# Patient Record
Sex: MALE | Race: WHITE | Employment: OTHER | URBAN - METROPOLITAN AREA
[De-identification: names, ages, dates, MRNs, and addresses within clinical notes are randomized per-mention and may not be internally consistent; named-entity substitution may affect disease eponyms.]

---

## 2017-01-09 ENCOUNTER — TELEPHONE (OUTPATIENT)
Dept: CARDIOLOGY CLINIC | Age: 67
End: 2017-01-09

## 2017-01-09 NOTE — TELEPHONE ENCOUNTER
----- Message from Barry Costa DO sent at 1/5/2017  8:49 AM EST -----  I am not sure whether I ordered this echocardiogram or not, but his LV function is quite similar to what it was back in December 2014. It is still severely depressed but stable at this juncture. Please let them know and we will discuss it further at his appointment in February.  ES

## 2017-01-09 NOTE — TELEPHONE ENCOUNTER
Pt and pt wife aware of results. Pt is having some epsiodes of sob and dizziness. Pt did go to PCP and was given antibiotic for sinus infection /cough. Pt has not monitored his weight and has not taken lasix. Instructed pt to monitor wt and if wt goes up 3 lbs to call office.  Pt wife verbalized understanding

## 2017-02-16 ENCOUNTER — ANTI-COAG VISIT (OUTPATIENT)
Dept: CARDIOLOGY CLINIC | Age: 67
End: 2017-02-16

## 2017-02-16 DIAGNOSIS — Z79.01 LONG TERM CURRENT USE OF ANTICOAGULANT THERAPY: Primary | ICD-10-CM

## 2017-02-16 DIAGNOSIS — I42.8 CARDIOMYOPATHY, NONISCHEMIC (HCC): ICD-10-CM

## 2017-02-16 LAB
INR BLD: 2.6
PT POC: 30.6 SEC
VALID INTERNAL CONTROL?: YES

## 2017-02-16 NOTE — PROGRESS NOTES
Verbal order and read back per Fermín Olivares NP  The INR is stable and therapeutic.  Continue same dose of coumadin and recheck in 6 weeks  Continue Coumadin 5mg daily except 2.5mg on Tues and Thurs  Patient informed of instructions, read back and verbalized understanding Matt Thrasher LPN

## 2017-03-08 ENCOUNTER — CLINICAL SUPPORT (OUTPATIENT)
Dept: CARDIOLOGY CLINIC | Age: 67
End: 2017-03-08

## 2017-03-08 ENCOUNTER — OFFICE VISIT (OUTPATIENT)
Dept: CARDIOLOGY CLINIC | Age: 67
End: 2017-03-08

## 2017-03-08 VITALS
HEART RATE: 79 BPM | DIASTOLIC BLOOD PRESSURE: 62 MMHG | WEIGHT: 197 LBS | SYSTOLIC BLOOD PRESSURE: 120 MMHG | BODY MASS INDEX: 26.68 KG/M2 | OXYGEN SATURATION: 98 % | HEIGHT: 72 IN

## 2017-03-08 DIAGNOSIS — I42.8 CARDIOMYOPATHY, NONISCHEMIC (HCC): Primary | ICD-10-CM

## 2017-03-08 DIAGNOSIS — I42.8 CARDIOMYOPATHY, NONISCHEMIC (HCC): ICD-10-CM

## 2017-03-08 DIAGNOSIS — Z95.810 AICD (AUTOMATIC CARDIOVERTER/DEFIBRILLATOR) PRESENT: ICD-10-CM

## 2017-03-08 DIAGNOSIS — I50.22 CHRONIC SYSTOLIC CONGESTIVE HEART FAILURE (HCC): Primary | ICD-10-CM

## 2017-03-08 NOTE — MR AVS SNAPSHOT
Visit Information Date & Time Provider Department Dept. Phone Encounter #  
 3/8/2017  3:00 PM Arvilla Lefort, [address] [facility] [address] [id number] Your Appointments 3/30/2017 11:00 AM  
ANTICOAG NURSE with Pt Inr Hv [facility] ([facility]) Appt Note: 6 week INR  
 [address] [facility] [place]arez [id number] [phone] [address] Upcoming Health Maintenance Date Due Hepatitis C Screening 1950 FOBT Q 1 YEAR AGE 50-75 10/21/1998 DTaP/Tdap/Td series (1 - Tdap) 1/28/2003 ZOSTER VACCINE AGE 60> 10/21/2008 GLAUCOMA SCREENING Q2Y 10/21/2013 MEDICARE YEARLY EXAM 10/21/2013 Pneumococcal 65+ Low/Medium Risk (2 of 2 - PPSV23) 10/15/2017 Allergies as of 3/8/2017  Review Complete On: 2/2/2017 By: Shalom Rowland RN Severity Noted Reaction Type Reactions Codeine Low 10/30/2015   Intolerance Nausea and Vomiting Current Immunizations  Never Reviewed Name Date Influenza High Dose Vaccine PF 10/15/2016 Pneumococcal Conjugate (PCV-13) 10/15/2016 Pneumococcal Polysaccharide (PPSV-23) 12/1/2010 Td 1/27/2003 Not reviewed this visit You Were Diagnosed With   
  
 Codes Comments Chronic systolic congestive heart failure (HCC)    -  Primary ICD-10-CM: B18.26 ICD-9-CM: 428.22, 428.0 Cardiomyopathy, nonischemic ([facility] [address]     ICD-10-CM: I42.9 ICD-9-CM: 425.4 AICD (automatic cardioverter/defibrillator) present     ICD-10-CM: Z95.810 ICD-9-CM: V45.02 Vitals BP Pulse Height(growth percentile) Weight(growth percentile) SpO2 BMI  
 120/62 79 6' (1.829 m) 197 lb (89.4 kg) 98% 26.72 kg/m2 Smoking Status Current Every Day Smoker Vitals History BMI and BSA Data Body Mass Index Body Surface Area  
 26.72 kg/m 2 2.13 m 2 Preferred Pharmacy Pharmacy Name Phone [facility] [address] Your Updated Medication List  
  
   
This list is accurate as of: 3/8/17  3:47 PM.  Always use your most recent med list.  
  
  
  
  
 carvedilol 25 mg tablet Commonly known as:  COREG  
TAKE ONE TABLET BY MOUTH TWICE DAILY WITH MEALS  
  
 furosemide 40 mg tablet Commonly known as:  LASIX  
take 1 tablet by mouth once daily AS NEEDED HYDROcodone-acetaminophen 5-325 mg per tablet Commonly known as:  Bella Gopi Take 1 Tab by mouth every four (4) hours as needed for Pain. Max Daily Amount: 6 Tabs. ramipril 10 mg capsule Commonly known as:  ALTACE  
take 1 capsule by mouth once daily  
  
 tadalafil 20 mg tablet Commonly known as:  CIALIS Take 1 Tab by mouth as needed. tamsulosin 0.4 mg capsule Commonly known as:  FLOMAX Take 1 Cap by mouth daily. Discontinue once stone passes. warfarin 5 mg tablet Commonly known as:  COUMADIN  
take 1 tablet by mouth daily EXCEPT ON TUESDAY AND THURSDAY TAKE 2.5MG (HALF A TABLET) We Performed the Following AMB POC EKG ROUTINE W/ 12 LEADS, INTER & REP [20029 CPT(R)] Introducing [facility] SERVICES! No Camarena introduces Cloupia patient portal. Now you can access parts of your medical record, email your doctor's office, and request medication refills online. 1. In your internet browser, go to [email or web address] 2. Click on the First Time User? Click Here link in the Sign In box. You will see the New Member Sign Up page. 3. Enter your Cloupia Access Code exactly as it appears below. You will not need to use this code after youve completed the sign-up process. If you do not sign up before the expiration date, you must request a new code. · Cloupia Access Code: [email or web address] Expires: 6/6/2017  2:38 PM 
 
4.  Enter the last four digits of your Social Security Number (xxxx) and Date of Birth (mm/dd/yyyy) as indicated and click Submit. You will be taken to the next sign-up page. 5. Create a GotaCopy ID. This will be your GotaCopy login ID and cannot be changed, so think of one that is secure and easy to remember. 6. Create a GotaCopy password. You can change your password at any time. 7. Enter your Password Reset Question and Answer. This can be used at a later time if you forget your password. 8. Enter your e-mail address. You will receive e-mail notification when new information is available in [address] 9. Click Sign Up. You can now view and download portions of your medical record. 10. Click the Download Summary menu link to download a portable copy of your medical information. If you have questions, please visit the Frequently Asked Questions section of the GotaCopy website. Remember, GotaCopy is NOT to be used for urgent needs. For medical emergencies, dial 911. Now available from your iPhone and Android! Please provide this summary of care documentation to your next provider. Your primary care clinician is listed as Sonny Oconnell. If you have any questions after today's visit, please call [phone].

## 2017-03-08 NOTE — PROGRESS NOTES
HPI: I saw Yosvany Castro in my office today in cardiovascular evaluation regarding his dilated nonischemic cardiomyopathy. Mr. Jairon Diallo is a very pleasant 77year old white male with history of atypical anginal chest discomfort and an abnormal nuclear myocardial perfusion study back in 2003 showing significant left ventricular dysfunction. His cardiac catheterization at that time demonstrated only mild diffuse coronary artery disease without significant obstructive disease and an ejection fraction that was markedly reduced at 25%. He was placed on Coreg, Altace and Lasix, as well as Coumadin to guard against peripheral embolization from the heart, and he has done very well over the years. He did have an AICD placed in the setting of his worsening left ventricular dysfunction with an upgrade to a biventricular ICD in January of 2006. He just had another generator change a little over a year ago on 5/20/15. He comes to the office today and relates that he is doing very well. He is staying quite active and denies any cardiovascular symptomatology at this time. Encounter Diagnoses   Name Primary?  Chronic systolic congestive heart failure (HCC) Yes    Cardiomyopathy, nonischemic ([address]     AICD (automatic cardioverter/defibrillator) present        Discussion: This gentleman is doing about as well as we could expect and really have no recommendations for change at this time. He is not having any symptoms to suggest the development of symptomatic obstructive coronary artery disease or any signs of heart failure. We are following his INRs in our Coumadin clinic and his INRs have been staying in the 2.0-3.0 range with his latest INR done in late February being 2.6. We did check his AICD today and he had 33 episodes of nonsustained V. tach but there were all for a few seconds the longest of which was 8 seconds in the past 4 months and he had no defibrillations.   He has 6.8 years remaining on his battery. His blood pressure is well-controlled today and since he is otherwise doing well I am simply going to plan to see him again in several months or as needed if any new cardiovascular symptoms surface in the interim. PCP:  Daxa Champion MD       Past Medical History:   Diagnosis Date    Abnormal nuclear cardiac imaging test 01/16/2008    Anterior patchy defect w/minimum ischemia. Inferior, inferior septal, inferoapical scar w/no ischemia. Scar in apex w/minimal ischemia & thinning. EF 27%. Severe global HYK. Gross LVE.  AICD (automatic cardioverter/defibrillator) present     Cardiomyopathy, nonischemic (HCC)     CHF (congestive heart failure) (HCC)     Dizziness     History of depression     History of echocardiogram 12/16/2014    Mod LVE. EF 20-25%. Severe diffuse hypk. Mild conc LVH. Gr 1 DDfx. RVSP 30 mmHg. Mild LAE. Mild MR. Unchanged from study of 9/20/12.     Long term (current) use of anticoagulants 3/1/2011    LV dysfunction     Urolithiasis          Past Surgical History:   Procedure Laterality Date    [facility] AICD CONCERTO BI VENTRIC F064  1/11/06    HX COLONOSCOPY  2005    polyp    HX CYSTECTOMY  1980    on back    HX HEART CATHETERIZATION  2/10/03    HX OTHER SURGICAL      eye surgery at the age of 1    HX OTHER SURGICAL  9/25/2012    lead extraction of Medtronic AICD    HX OTHER SURGICAL  5/20/15    AICD generator change out    HX PACEMAKER      HX TONSILLECTOMY      at 4years of age         Current Outpatient Rx   Name  Route  Sig  Dispense  Refill    warfarin (COUMADIN) 5 mg tablet        take 1 tablet by mouth daily EXCEPT ON TUESDAY AND THURSDAY TAKE 2.5MG (HALF A TABLET)    90 Tab    3      ramipril (ALTACE) 10 mg capsule        take 1 capsule by mouth once daily    90 Cap    3      furosemide (LASIX) 40 mg tablet        take 1 tablet by mouth once daily AS NEEDED    20 Tab    3      tadalafil (CIALIS, ADCIRCA) 20 mg tablet    Oral    Take 1 Tab by mouth as needed. 6 Tab    3      carvedilol (COREG) 25 mg tablet        take 1 tablet by mouth twice a day with meals    180 Tab    3           Allergies   Allergen Reactions    Codeine Nausea and Vomiting       Social   Social History   Substance Use Topics    Smoking status: Current Every Day Smoker     Packs/day: 0.10     Years: 30.00    Smokeless tobacco: Never Used    Alcohol use No         Family history: family history includes Cancer in his father. Review of Systems:   Constitutional: Negative. Respiratory: Negative. Cardiovascular: Negative. Gastrointestinal: Negative. Musculoskeletal: Negative. Physical Exam:   The patient is an alert, oriented, well developed, well nourished 74 y.o.  male who was in no acute distress at the time of my examination. Visit Vitals    /62    Pulse 79    Ht 6' (1.829 m)    Wt 89.4 kg (197 lb)    SpO2 98%    BMI 26.72 kg/m2      BP Readings from Last 3 Encounters:   03/08/17 120/62   02/02/17 107/67   12/06/16 100/70        Wt Readings from Last 3 Encounters:   03/08/17 89.4 kg (197 lb)   02/02/17 88.5 kg (195 lb)   12/06/16 87.5 kg (193 lb)       HEENT: Conjunctivae white, mucosa moist, no pallor or cyanosis. Marked deviation of left eye laterally. Neck: Supple without masses, tenderness or thyromegaly. No jugular venous distention. Carotid upstrokes are full bilaterally, without bruits. Cardiovascular: Chest is symmetrical with good excursion. Defibrillator in left upper chest with well healed scar and one small stitch which I removed. [place] [facility] No lifts, heaves or thrills. S1 and S2 are normal, without appreciable murmurs, rubs, clicks or gallops. Defibrillator site is well healed. Lungs: Clear to auscultation in all fields, except for a few rales in the left base. Abdomen: Soft, with no masses, tenderness or organomegaly. Extremities: No edema, with full peripheral pulses.      Review of Data: Please refer to past medical history for most recent cardiac testing. Results for orders placed or performed in visit on 03/08/17   AMB POC EKG ROUTINE W/ 12 LEADS, INTER & REP     Status: None    Narrative    AV sequentially paced rhythm with a rate of 79. This EKG is different from his EKG of July 22, 2016 since at that time he was in a normal sinus mechanism with atrial sensing and ventricular pacing. Adriel Keita D.O., F.A.C.C. [facility]  [facility]  [address] [place] [address]. [address] [address]    PLEASE NOTE:  This document has been produced using voice recognition software. Unrecognized errors in transcription may be present.

## 2017-03-08 NOTE — PROGRESS NOTES
1. Have you been to the ER, urgent care clinic since your last visit? Hospitalized since your last visit? No     2. Have you seen or consulted any other health care providers outside of the [address] [place] [address] since your last visit? Include any pap smears or colon screening.  no

## 2017-03-10 NOTE — PROGRESS NOTES
I have personally seen and evaluated the device findings. Interrogation reviewed and I agree with assessment.     Yari Beckett

## 2017-03-22 RX ORDER — RAMIPRIL 10 MG/1
CAPSULE ORAL
Qty: 90 CAP | Refills: 3 | Status: SHIPPED | OUTPATIENT
Start: 2017-03-22 | End: 2018-03-26 | Stop reason: SDUPTHER

## 2017-03-30 ENCOUNTER — ANTI-COAG VISIT (OUTPATIENT)
Dept: CARDIOLOGY CLINIC | Age: 67
End: 2017-03-30

## 2017-03-30 DIAGNOSIS — Z79.01 LONG TERM CURRENT USE OF ANTICOAGULANT THERAPY: Primary | ICD-10-CM

## 2017-03-30 DIAGNOSIS — I42.8 CARDIOMYOPATHY, NONISCHEMIC (HCC): ICD-10-CM

## 2017-03-30 LAB
INR BLD: 2
PT POC: 24.4 SEC
VALID INTERNAL CONTROL?: YES

## 2017-03-30 NOTE — PROGRESS NOTES
Verbal order and read back per [facility] NP  The INR is stable and therapeutic.  Continue same dose of coumadin and recheck in 6 weeks  Continue Coumadin 5mg daily except 2.5mg on Tues and Thurs  Patient informed of instructions, read back and verbalized understanding Mt Momin LPN

## 2017-05-10 ENCOUNTER — ANTI-COAG VISIT (OUTPATIENT)
Dept: CARDIOLOGY CLINIC | Age: 67
End: 2017-05-10

## 2017-05-10 DIAGNOSIS — I42.8 CARDIOMYOPATHY, NONISCHEMIC (HCC): ICD-10-CM

## 2017-05-10 DIAGNOSIS — Z79.01 LONG TERM CURRENT USE OF ANTICOAGULANT THERAPY: Primary | ICD-10-CM

## 2017-05-10 LAB
INR BLD: 3.2
PT POC: 38.9 SEC
VALID INTERNAL CONTROL?: YES

## 2017-05-10 NOTE — PROGRESS NOTES
The INR is above the therapeutic range. Ask the patient about bleeding complications. Please make the following adjustments to Coumadin dosing:  Only 2.5mg today then continue Coumadin 5mg daily except 2.5mg on Tues and Thurs  Repeat the INR in 5 weeks

## 2017-05-15 ENCOUNTER — OFFICE VISIT (OUTPATIENT)
Dept: INTERNAL MEDICINE CLINIC | Age: 67
End: 2017-05-15

## 2017-05-15 VITALS
TEMPERATURE: 96.5 F | SYSTOLIC BLOOD PRESSURE: 98 MMHG | OXYGEN SATURATION: 96 % | RESPIRATION RATE: 16 BRPM | WEIGHT: 192 LBS | BODY MASS INDEX: 26.01 KG/M2 | HEIGHT: 72 IN | HEART RATE: 78 BPM | DIASTOLIC BLOOD PRESSURE: 70 MMHG

## 2017-05-15 DIAGNOSIS — M25.572 CHRONIC PAIN OF LEFT ANKLE: Primary | ICD-10-CM

## 2017-05-15 DIAGNOSIS — I42.8 CARDIOMYOPATHY, NONISCHEMIC (HCC): ICD-10-CM

## 2017-05-15 DIAGNOSIS — G89.29 CHRONIC PAIN OF LEFT ANKLE: Primary | ICD-10-CM

## 2017-05-15 NOTE — PATIENT INSTRUCTIONS
Heart Failure: Care Instructions  Your Care Instructions    Heart failure occurs when your heart does not pump as much blood as the body needs. Failure does not mean that the heart has stopped pumping but rather that it is not pumping as well as it should. Over time, this causes fluid buildup in your lungs and other parts of your body. Fluid buildup can cause shortness of breath, fatigue, swollen ankles, and other problems. By taking medicines regularly, reducing sodium (salt) in your diet, checking your weight every day, and making lifestyle changes, you can feel better and live longer. Follow-up care is a key part of your treatment and safety. Be sure to make and go to all appointments, and call your doctor if you are having problems. It's also a good idea to know your test results and keep a list of the medicines you take. How can you care for yourself at home? Medicines  · Be safe with medicines. Take your medicines exactly as prescribed. Call your doctor if you think you are having a problem with your medicine. · Do not take any vitamins, over-the-counter medicine, or herbal products without talking to your doctor first. Toshia Duvalk not take ibuprofen (Advil or Motrin) and naproxen (Aleve) without talking to your doctor first. They could make your heart failure worse. · You may be taking some of the following medicine. ¨ Beta-blockers can slow heart rate, decrease blood pressure, and improve your condition. Taking a beta-blocker may lower your chance of needing to be hospitalized. ¨ Angiotensin-converting enzyme inhibitors (ACEIs) reduce the heart's workload, lower blood pressure, and reduce swelling. Taking an ACEI may lower your chance of needing to be hospitalized again. ¨ Angiotensin II receptor blockers (ARBs) work like ACEIs. Your doctor may prescribe them instead of ACEIs. ¨ Diuretics, also called water pills, reduce swelling.   ¨ Potassium supplements replace this important mineral, which is sometimes lost with diuretics. ¨ Aspirin and other blood thinners prevent blood clots, which can cause a stroke or heart attack. You will get more details on the specific medicines your doctor prescribes. Diet  · Your doctor may suggest that you limit sodium to 2,000 milligrams (mg) a day or less. That is less than 1 teaspoon of salt a day, including all the salt you eat in cooking or in packaged foods. People get most of their sodium from processed foods. Fast food and restaurant meals also tend to be very high in sodium. · Ask your doctor how much liquid you can drink each day. You may have to limit liquids. Weight  · Weigh yourself without clothing at the same time each day. Record your weight. Call your doctor if you gain more than 3 pounds in 2 to 3 days. A sudden weight gain may mean that your heart failure is getting worse. Activity level  · Start light exercise (if your doctor says it is okay). Even if you can only do a small amount, exercise will help you get stronger, have more energy, and manage your weight and your stress. Walking is an easy way to get exercise. Start out by walking a little more than you did before. Bit by bit, increase the amount you walk. · When you exercise, watch for signs that your heart is working too hard. You are pushing yourself too hard if you cannot talk while you are exercising. If you become short of breath or dizzy or have chest pain, stop, sit down, and rest.  · If you feel \"wiped out\" the day after you exercise, walk slower or for a shorter distance until you can work up to a better pace. · Get enough rest at night. Sleeping with 1 or 2 pillows under your upper body and head may help you breathe easier. Lifestyle changes  · Do not smoke. Smoking can make a heart condition worse. If you need help quitting, talk to your doctor about stop-smoking programs and medicines. These can increase your chances of quitting for good.  Quitting smoking may be the most important step you can take to protect your heart. · Limit alcohol to 2 drinks a day for men and 1 drink a day for women. Too much alcohol can cause health problems. · Avoid getting sick from colds and the flu. Get a pneumococcal vaccine shot. If you have had one before, ask your doctor whether you need another dose. Get a flu shot each year. If you must be around people with colds or the flu, wash your hands often. When should you call for help? Call 911 if you have symptoms of sudden heart failure such as:  · You have severe trouble breathing. · You cough up pink, foamy mucus. · You have a new irregular or rapid heartbeat. Call your doctor now or seek immediate medical care if:  · You have new or increased shortness of breath. · You are dizzy or lightheaded, or you feel like you may faint. · You have sudden weight gain, such as 3 pounds or more in 2 to 3 days. · You have increased swelling in your legs, ankles, or feet. · You are suddenly so tired or weak that you cannot do your usual activities. Watch closely for changes in your health, and be sure to contact your doctor if:  · You develop new symptoms. Where can you learn more? Go to http://johanna-carlos.info/. Enter L672 in the search box to learn more about \"Heart Failure: Care Instructions. \"  Current as of: January 27, 2016  Content Version: 11.2  © 6877-6404 [employer]. Care instructions adapted under license by [employer] (which disclaims liability or warranty for this information). If you have questions about a medical condition or this instruction, always ask your healthcare professional. [place] [address] any warranty or liability for your use of this information.

## 2017-05-15 NOTE — PROGRESS NOTES
HPI:   (L) ankle pain x 6 mos (atraumatic); mild pain with swelling intermittently  Hx notable for nonischemic CM (on coumadin)    ROS is otherwise negative. Past Medical History:   Diagnosis Date    Abnormal nuclear cardiac imaging test 01/16/2008    Anterior patchy defect w/minimum ischemia. Inferior, inferior septal, inferoapical scar w/no ischemia. Scar in apex w/minimal ischemia & thinning. EF 27%. Severe global HYK. Gross LVE.  AICD (automatic cardioverter/defibrillator) present     Cardiomyopathy, nonischemic (HCC)     CHF (congestive heart failure) (HCC)     Dizziness     History of depression     History of echocardiogram 12/16/2014    Mod LVE. EF 20-25%. Severe diffuse hypk. Mild conc LVH. Gr 1 DDfx. RVSP 30 mmHg. Mild LAE. Mild MR. Unchanged from study of 9/20/12.     Long term (current) use of anticoagulants 3/1/2011    LV dysfunction     Urolithiasis        Past Surgical History:   Procedure Laterality Date    [facility]    HX COLONOSCOPY  2005    polyp    HX CYSTECTOMY  1980    on back    HX HEART CATHETERIZATION  2/10/03    HX OTHER SURGICAL      eye surgery at the age of 1    HX OTHER SURGICAL  9/25/2012    lead extraction of [employer]tronic AICD    HX OTHER SURGICAL  5/20/15    AICD generator change out    HX PACEMAKER      HX TONSILLECTOMY      at 4years of age       Social History     Social History    Marital status:      Spouse name: N/A    Number of children: N/A    Years of education: N/A     Occupational History    QA      Social History Main Topics    Smoking status: Current Every Day Smoker     Packs/day: 0.10     Years: 30.00    Smokeless tobacco: Never Used    Alcohol use No    Drug use: No    Sexual activity: Not on file     Other Topics Concern    Not on file     Social History Narrative       Allergies   Allergen Reactions    Codeine Nausea and Vomiting       Family History   Problem Relation Age of Onset  Cancer Father        Current Outpatient Prescriptions   Medication Sig Dispense Refill    ramipril (ALTACE) 10 mg capsule TAKE ONE CAPSULE BY MOUTH ONCE DAILY 90 Cap 3    tamsulosin (FLOMAX) 0.4 mg capsule Take 1 Cap by mouth daily. Discontinue once stone passes. 7 Cap 0    carvedilol (COREG) 25 mg tablet TAKE ONE TABLET BY MOUTH TWICE DAILY WITH MEALS 180 Tab 2    warfarin (COUMADIN) 5 mg tablet take 1 tablet by mouth daily EXCEPT ON TUESDAY AND THURSDAY TAKE 2.5MG (HALF A TABLET) 90 Tab 3    furosemide (LASIX) 40 mg tablet take 1 tablet by mouth once daily AS NEEDED 20 Tab 3    tadalafil (CIALIS, ADCIRCA) 20 mg tablet Take 1 Tab by mouth as needed. 6 Tab 3           Visit Vitals    BP 98/70 (BP 1 Location: Left arm, BP Patient Position: Sitting)    Pulse 78    Temp 96.5 °F (35.8 °C) (Tympanic)    Resp 16    Ht 6' (1.829 m)    Wt 192 lb (87.1 kg)    SpO2 96%    BMI 26.04 kg/m2       PE  Well nourished in NAD  HEENT:   OP: clear. Neck: supple w/o mass or bruits. Chest: clear. CV: RRR w/o m,r,g; pulses intact. Abd: soft, NT, w/o HSM or mass. Ext: tr edema. (L) ankle: minimal swelling/tendernss; FROM with mild pain  Neuro: NF. Assessment and Plan    Encounter Diagnoses   Name Primary?  Chronic pain of left ankle Yes    Cardiomyopathy, nonischemic (HCC)      OA of (L) ankle - tylenol prn; to ortho if worsens  Nonischemic CM - management per cardiology  The patient was counseled on the dangers of tobacco use, and was advised to quit. Reviewed strategies to maximize success, including removing cigarettes and smoking materials from environment.   OV 6 mos or prn  I have explained plan to patient and the patient verbalizes understanding

## 2017-05-15 NOTE — PROGRESS NOTES
1. Have you been to the ER, urgent care clinic since your last visit? Hospitalized since your last visit? Yes When: march Where: [facility] Reason for visit: kidney stones    2. Have you seen or consulted any other health care providers outside of the [address] [place] [address] since your last visit? Include any pap smears or colon screening.  No

## 2017-05-23 RX ORDER — WARFARIN SODIUM 5 MG/1
TABLET ORAL
Qty: 90 TAB | Refills: 3 | Status: SHIPPED | OUTPATIENT
Start: 2017-05-23 | End: 2018-07-30 | Stop reason: SDUPTHER

## 2017-05-25 ENCOUNTER — ANTI-COAG VISIT (OUTPATIENT)
Dept: CARDIOLOGY CLINIC | Age: 67
End: 2017-05-25

## 2017-05-25 DIAGNOSIS — I42.8 CARDIOMYOPATHY, NONISCHEMIC (HCC): ICD-10-CM

## 2017-05-25 DIAGNOSIS — Z79.01 LONG TERM CURRENT USE OF ANTICOAGULANT THERAPY: Primary | ICD-10-CM

## 2017-05-25 LAB
INR BLD: 2.3
PT POC: 27.6 SECONDS
VALID INTERNAL CONTROL?: YES

## 2017-05-25 NOTE — PROGRESS NOTES
The INR is stable and therapeutic. Hold today then continue Coumadin 5mg daily except 2.5mg on Tues and Thurs (after dental surgery) and recheck in 3 weeks.         Verbal order and read back per Lala Craven

## 2017-06-15 ENCOUNTER — ANTI-COAG VISIT (OUTPATIENT)
Dept: CARDIOLOGY CLINIC | Age: 67
End: 2017-06-15

## 2017-06-15 DIAGNOSIS — I42.8 CARDIOMYOPATHY, NONISCHEMIC (HCC): Primary | ICD-10-CM

## 2017-06-15 DIAGNOSIS — Z79.01 LONG TERM CURRENT USE OF ANTICOAGULANT THERAPY: ICD-10-CM

## 2017-06-15 LAB
INR BLD: 3.2
PT POC: 38.1 SECONDS
VALID INTERNAL CONTROL?: YES

## 2017-06-15 NOTE — PROGRESS NOTES
Verbal order and read back per Amada Judge NP  The INR is above the therapeutic range. Ask the patient about bleeding complications. Please make the following adjustments to Coumadin dosing: Hold X 1 then continue Coumadin 5mg daily except 2.5mg on Tues and Thurs   Repeat the INR in 3 weeks. Patient informed of instructions, read back and verbalized understanding Hiral Magaña LPN    Lab slip provided for patient to have PT/INR drawn at lab closer to home.

## 2017-07-07 ENCOUNTER — TELEPHONE ANTICOAG (OUTPATIENT)
Dept: CARDIOLOGY CLINIC | Age: 67
End: 2017-07-07

## 2017-07-07 DIAGNOSIS — I42.8 CARDIOMYOPATHY, NONISCHEMIC (HCC): ICD-10-CM

## 2017-07-07 DIAGNOSIS — Z79.01 LONG TERM CURRENT USE OF ANTICOAGULANT THERAPY: Primary | ICD-10-CM

## 2017-07-07 LAB
INR PPP: 2.5 (ref 0.8–1.2)
PROTHROMBIN TIME: 25.2 SEC (ref 9.1–12)

## 2017-07-07 NOTE — PROGRESS NOTES
Verbal order and read back per Anuradha Park, NP  The INR is stable and therapeutic. Continue same dose of coumadin and recheck in 5 weeks  Continue Coumadin 5mg daily except 2.5mg on Tues and Thurs   Patient's wife informed of instructions, read back and verbalized understanding. Appointment scheduled in office in 5 weeks.   Shireen Persaud LPN

## 2017-08-09 ENCOUNTER — ANTI-COAG VISIT (OUTPATIENT)
Dept: CARDIOLOGY CLINIC | Age: 67
End: 2017-08-09

## 2017-08-09 DIAGNOSIS — Z79.01 LONG TERM CURRENT USE OF ANTICOAGULANT THERAPY: Primary | ICD-10-CM

## 2017-08-09 DIAGNOSIS — I42.8 CARDIOMYOPATHY, NONISCHEMIC (HCC): ICD-10-CM

## 2017-08-09 LAB
INR BLD: 3.3
PT POC: 39.1 SECONDS
VALID INTERNAL CONTROL?: YES

## 2017-08-09 NOTE — PROGRESS NOTES
Verbal order and read back per Amando Schooling NP  The INR is above the therapeutic range. Ask the patient about bleeding complications. Please make the following adjustments to Coumadin dosing: Take 2.5 mg X 1 then Continue Coumadin 5mg daily except 2.5mg on Tues and Thurs   Repeat the INR in 5 weeks. (patient request 6 weeks appointment, informed patient that since INR was slightly elevated he should return sooner.  He agreed upon 5 weeks)  Patient informed of instructions, read back and verbalized understanding Deepali Medeiros LPN

## 2017-09-08 RX ORDER — CARVEDILOL 25 MG/1
TABLET ORAL
Qty: 180 TAB | Refills: 2 | Status: SHIPPED | OUTPATIENT
Start: 2017-09-08 | End: 2018-06-19 | Stop reason: SDUPTHER

## 2017-09-13 ENCOUNTER — ANTI-COAG VISIT (OUTPATIENT)
Dept: CARDIOLOGY CLINIC | Age: 67
End: 2017-09-13

## 2017-09-13 DIAGNOSIS — Z79.01 LONG TERM CURRENT USE OF ANTICOAGULANT THERAPY: Primary | ICD-10-CM

## 2017-09-13 DIAGNOSIS — I42.8 CARDIOMYOPATHY, NONISCHEMIC (HCC): ICD-10-CM

## 2017-09-13 LAB
INR BLD: 2.4
PT POC: 29.3 SECONDS
VALID INTERNAL CONTROL?: YES

## 2017-09-13 NOTE — PROGRESS NOTES
The INR is stable and therapeutic.    Continue Coumadin 5mg daily except 2.5mg on Tues and Thurs   Recheck INR in 6 weeks

## 2017-10-25 ENCOUNTER — ANTI-COAG VISIT (OUTPATIENT)
Dept: CARDIOLOGY CLINIC | Age: 67
End: 2017-10-25

## 2017-10-25 DIAGNOSIS — I42.8 CARDIOMYOPATHY, NONISCHEMIC (HCC): Primary | ICD-10-CM

## 2017-10-25 DIAGNOSIS — Z79.01 LONG TERM CURRENT USE OF ANTICOAGULANT THERAPY: ICD-10-CM

## 2017-10-25 LAB
INR BLD: 3
PT POC: 35.5 SECONDS
VALID INTERNAL CONTROL?: YES

## 2017-10-25 NOTE — PROGRESS NOTES
Verbal order and read back per Maddy Zelaya NP  The INR is stable and therapeutic. Continue same dose of coumadin and recheck in 5 weeks  Continue Coumadin 5mg daily except 2.5mg on Tues and Thurs. Patient states he will have a serving of green leafy vegetables today.    Patient informed of instructions, read back and verbalized understanding Svitlana Smart LPN

## 2017-11-28 ENCOUNTER — TELEPHONE (OUTPATIENT)
Dept: CARDIOLOGY CLINIC | Age: 67
End: 2017-11-28

## 2017-11-28 NOTE — TELEPHONE ENCOUNTER
Patient's wife called to informed the office the patient was diagnosed with pneumonia and started on Azithromycin on 11/26/17. Patient is on Coumadin. Verbal order and read back per [address], NP to decrease Coumadin to 2.5 mg daily while taking antibiotic. Wife will contact [facility] to see if patient can have PT/INR drawn during follow up visit at there facility this week or she will attempt to bring patient to INR appointment in our office.  Sharri Lenz LPN

## 2017-11-29 ENCOUNTER — ANTI-COAG VISIT (OUTPATIENT)
Dept: CARDIOLOGY CLINIC | Age: 67
End: 2017-11-29

## 2017-11-29 ENCOUNTER — OFFICE VISIT (OUTPATIENT)
Dept: INTERNAL MEDICINE CLINIC | Age: 67
End: 2017-11-29

## 2017-11-29 VITALS
RESPIRATION RATE: 16 BRPM | BODY MASS INDEX: 25.06 KG/M2 | HEIGHT: 72 IN | SYSTOLIC BLOOD PRESSURE: 100 MMHG | OXYGEN SATURATION: 97 % | TEMPERATURE: 96.3 F | HEART RATE: 74 BPM | WEIGHT: 185 LBS | DIASTOLIC BLOOD PRESSURE: 70 MMHG

## 2017-11-29 DIAGNOSIS — Z79.01 LONG TERM CURRENT USE OF ANTICOAGULANT THERAPY: ICD-10-CM

## 2017-11-29 DIAGNOSIS — I42.8 CARDIOMYOPATHY, NONISCHEMIC (HCC): Primary | ICD-10-CM

## 2017-11-29 DIAGNOSIS — J06.9 ACUTE URI: ICD-10-CM

## 2017-11-29 DIAGNOSIS — I42.8 CARDIOMYOPATHY, NONISCHEMIC (HCC): ICD-10-CM

## 2017-11-29 DIAGNOSIS — Z00.00 ROUTINE GENERAL MEDICAL EXAMINATION AT A HEALTH CARE FACILITY: Primary | ICD-10-CM

## 2017-11-29 DIAGNOSIS — I50.22 CHRONIC SYSTOLIC CONGESTIVE HEART FAILURE (HCC): ICD-10-CM

## 2017-11-29 LAB
INR BLD: 4.3
PT POC: 51.2 SECONDS
VALID INTERNAL CONTROL?: YES

## 2017-11-29 RX ORDER — ALBUTEROL SULFATE 90 UG/1
AEROSOL, METERED RESPIRATORY (INHALATION)
Qty: 1 INHALER | Refills: 1 | Status: SHIPPED | OUTPATIENT
Start: 2017-11-29 | End: 2021-08-23

## 2017-11-29 NOTE — PATIENT INSTRUCTIONS
Upper Respiratory Infection (Cold): Care Instructions  Your Care Instructions    An upper respiratory infection, or URI, is an infection of the nose, sinuses, or throat. URIs are spread by coughs, sneezes, and direct contact. The common cold is the most frequent kind of URI. The flu and sinus infections are other kinds of URIs. Almost all URIs are caused by viruses. Antibiotics won't cure them. But you can treat most infections with home care. This may include drinking lots of fluids and taking over-the-counter pain medicine. You will probably feel better in 4 to 10 days. The doctor has checked you carefully, but problems can develop later. If you notice any problems or new symptoms, get medical treatment right away. Follow-up care is a key part of your treatment and safety. Be sure to make and go to all appointments, and call your doctor if you are having problems. It's also a good idea to know your test results and keep a list of the medicines you take. How can you care for yourself at home? · To prevent dehydration, drink plenty of fluids, enough so that your urine is light yellow or clear like water. Choose water and other caffeine-free clear liquids until you feel better. If you have kidney, heart, or liver disease and have to limit fluids, talk with your doctor before you increase the amount of fluids you drink. · Take an over-the-counter pain medicine, such as acetaminophen (Tylenol), ibuprofen (Advil, Motrin), or naproxen (Aleve). Read and follow all instructions on the label. · Before you use cough and cold medicines, check the label. These medicines may not be safe for young children or for people with certain health problems. · Be careful when taking over-the-counter cold or flu medicines and Tylenol at the same time. Many of these medicines have acetaminophen, which is Tylenol. Read the labels to make sure that you are not taking more than the recommended dose.  Too much acetaminophen (Tylenol) can be harmful. · Get plenty of rest.  · Do not smoke or allow others to smoke around you. If you need help quitting, talk to your doctor about stop-smoking programs and medicines. These can increase your chances of quitting for good. When should you call for help? Call 911 anytime you think you may need emergency care. For example, call if:  ? · You have severe trouble breathing. ?Call your doctor now or seek immediate medical care if:  ? · You seem to be getting much sicker. ? · You have new or worse trouble breathing. ? · You have a new or higher fever. ? · You have a new rash. ? Watch closely for changes in your health, and be sure to contact your doctor if:  ? · You have a new symptom, such as a sore throat, an earache, or sinus pain. ? · You cough more deeply or more often, especially if you notice more mucus or a change in the color of your mucus. ? · You do not get better as expected. Where can you learn more? Go to http://johanna-carlos.info/. Enter K809 in the search box to learn more about \"Upper Respiratory Infection (Cold): Care Instructions. \"  Current as of: May 12, 2017  Content Version: 11.4  © 3966-8583 [employer]. Care instructions adapted under license by [employer] (which disclaims liability or warranty for this information). If you have questions about a medical condition or this instruction, always ask your healthcare professional. [place] [address] any warranty or liability for your use of this information. Medicare Wellness Visit, Male    The best way to live healthy is to have a healthy lifestyle by eating a well-balanced diet, exercising regularly, limiting alcohol and stopping smoking. Regular physical exams and screening tests are another way to keep healthy. Preventive exams provided by your health care provider can find health problems before they become diseases or illnesses.  Preventive services including immunizations, screening tests, monitoring and exams can help you take care of your own health. All people over age 72 should have a pneumovax  and and a prevnar shot to prevent pneumonia. These are once in a lifetime unless you and your provider decide differently. All people over 65 should have a yearly flu shot and a tetanus vaccine every 10 years. Screening for diabetes mellitus with a blood sugar test should be done every year. Glaucoma is a disease of the eye due to increased ocular pressure that can lead to blindness and it should be done every year by an eye professional.    Cardiovascular screening tests that check for elevated lipids (fatty part of blood) which can lead to heart disease and strokes should be done every 5 years. Colorectal screening that evaluates for blood or polyps in your colon should be done yearly as a stool test or every five years as a flexible sigmoidoscope or every 10 years as a colonoscopy up to age 76. Men up to age 76 may need a screening blood test for prostate cancer at certain intervals, depending on their personal and family history. This decision is between the patient and his provider. If you have been a smoker or had family history of abdominal aortic aneurysms, you and your provider may decide to schedule an ultrasound test of your aorta. Hepatitis C screening is also recommended for anyone born between [birth year] through [address]. A shingles vaccine is also recommended once in a lifetime after age 61. Your Medicare Wellness Exam is recommended annually.     Here is a list of your current Health Maintenance items with a due date:  Health Maintenance Due   Topic Date Due    Hepatitis C Test  1950    Stool testing for trace blood  10/21/1998    DTaP/Tdap/Td  (1 - Tdap) 01/28/2003    Shingles Vaccine  08/21/2008    Glaucoma Screening   10/21/2013    Annual Well Visit  10/21/2013    Flu Vaccine  08/01/2017    Pneumococcal Vaccine (2 of 2 - PPSV23) 10/15/2017

## 2017-11-29 NOTE — PROGRESS NOTES
1. Have you been to the ER, urgent care clinic since your last visit? Hospitalized since your last visit? Yes When: nov 26 Where: patient [facility] Reason for visit: flu symptom    2. Have you seen or consulted any other health care providers outside of the [address] [place] [address] since your last visit? Include any pap smears or colon screening.  No

## 2017-11-29 NOTE — PROGRESS NOTES
Verbal order and read back per Rocío Mason NP  The INR is above the therapeutic range. Ask the patient about bleeding complications. Please make the following adjustments to Coumadin dosing: Hold today then 2.5 mg X 2 while on antibiotic then Continue Coumadin 5mg daily except 2.5mg on Tues and Thurs   Repeat the INR in 1 week.   Patient and wife informed of instructions, read back and verbalized understanding Marily Palumbo LPN

## 2017-11-29 NOTE — PROGRESS NOTES
This is an Initial Medicare Annual Wellness Exam (AWV) (Performed 12 months after IPPE or effective date of Medicare Part B enrollment, Once in a lifetime)    I have reviewed the patient's medical history in detail and updated the computerized patient record. History     Past Medical History:   Diagnosis Date    Abnormal nuclear cardiac imaging test 01/16/2008    Anterior patchy defect w/minimum ischemia. Inferior, inferior septal, inferoapical scar w/no ischemia. Scar in apex w/minimal ischemia & thinning. EF 27%. Severe global HYK. Gross LVE.  AICD (automatic cardioverter/defibrillator) present     Cardiomyopathy, nonischemic (HCC)     CHF (congestive heart failure) (HCC)     Dizziness     History of depression     History of echocardiogram 12/16/2014    Mod LVE. EF 20-25%. Severe diffuse hypk. Mild conc LVH. Gr 1 DDfx. RVSP 30 mmHg. Mild LAE. Mild MR. Unchanged from study of 9/20/12.     Long term (current) use of anticoagulants 3/1/2011    LV dysfunction     Urolithiasis       Past Surgical History:   Procedure Laterality Date    [facility]. AICD CONCERTO BI VENTRIC G488  1/11/06    HX COLONOSCOPY  2005    polyp    HX CYSTECTOMY  1980    on back    HX HEART CATHETERIZATION  2/10/03    HX OTHER SURGICAL      eye surgery at the age of 1    HX OTHER SURGICAL  9/25/2012    lead extraction of [employer] AICD    HX OTHER SURGICAL  5/20/15    AICD generator change out    HX PACEMAKER      HX TONSILLECTOMY      at 4years of age     Current Outpatient Prescriptions   Medication Sig Dispense Refill    albuterol (PROVENTIL HFA, VENTOLIN HFA, PROAIR HFA) 90 mcg/actuation inhaler 2 puffs q 6 hrs prn cough/congestion 1 Inhaler 1    carvedilol (COREG) 25 mg tablet TAKE ONE TABLET BY MOUTH TWICE DAILY WITH MEALS 180 Tab 2    warfarin (COUMADIN) 5 mg tablet TAKE ONE TABLET BY MOUTH ONCE DAILY EXCEPT ON TUESDAY AND THURSDAY TAKE ONE-HALF TABLET 90 Tab 3    ramipril (ALTACE) 10 mg capsule TAKE ONE CAPSULE BY MOUTH ONCE DAILY 90 Cap 3    tamsulosin (FLOMAX) 0.4 mg capsule Take 1 Cap by mouth daily. Discontinue once stone passes. 7 Cap 0    furosemide (LASIX) 40 mg tablet take 1 tablet by mouth once daily AS NEEDED 20 Tab 3    tadalafil (CIALIS, ADCIRCA) 20 mg tablet Take 1 Tab by mouth as needed. 6 Tab 3     Allergies   Allergen Reactions    Codeine Nausea and Vomiting     Family History   Problem Relation Age of Onset    Cancer Father      Social History   Substance Use Topics    Smoking status: Current Every Day Smoker     Packs/day: 0.10     Years: 30.00    Smokeless tobacco: Never Used    Alcohol use No     Patient Active Problem List   Diagnosis Code    Cardiomyopathy, nonischemic ([facility] [address] I42.8    AICD (automatic cardioverter/defibrillator) present Z95.810    Long term current use of anticoagulant therapy Z79.01    AICD lead malfunction T82.110A    CHF (congestive heart failure) ([facility] [address] I50.9    Advance directive discussed with patient Z71.89       Depression Risk Factor Screening:     PHQ over the last two weeks 5/15/2017   Little interest or pleasure in doing things Not at all   Feeling down, depressed or hopeless Not at all   Total Score PHQ 2 0     Alcohol Risk Factor Screening: You do not drink alcohol or very rarely. Functional Ability and Level of Safety:     Hearing Loss  Hearing is good. Activities of Daily Living  The home contains: no safety equipment. Patient does total self care    Fall Risk  Fall Risk Assessment, last 12 mths 10/30/2015   Able to walk? Yes   Fall in past 12 months? Yes   Fall with injury?  Yes   Number of falls in past 12 months 1   Fall Risk Score 2       Abuse Screen  Patient is not abused    Cognitive Screening   Evaluation of Cognitive Function:  Has your family/caregiver stated any concerns about your memory: no  Normal    Patient Care Team   Patient Care Team:  Hernan Mcmahon MD as PCP - General (Internal Medicine)  Gregorio Perez, DO (Cardiology)    Assessment/Plan   Education and counseling provided:  Are appropriate based on today's review and evaluation    Diagnoses and all orders for this visit:    1. Routine general medical examination at a health care facility    2. Cardiomyopathy, nonischemic (HCC)  -     METABOLIC PANEL, COMPREHENSIVE; Future  -     CBC WITH AUTOMATED DIFF; Future    3. Chronic systolic congestive heart failure (HCC)  -     LIPID PANEL; Future    4. Acute URI    Other orders  -     albuterol (PROVENTIL HFA, VENTOLIN HFA, PROAIR HFA) 90 mcg/actuation inhaler; 2 puffs q 6 hrs prn cough/congestion         Health Maintenance Due   Topic Date Due    Hepatitis C Screening  1950    FOBT Q 1 YEAR AGE 50-75  10/21/1998    DTaP/Tdap/Td series (1 - Tdap) 01/28/2003    ZOSTER VACCINE AGE 60>  08/21/2008    GLAUCOMA SCREENING Q2Y  10/21/2013    MEDICARE YEARLY EXAM  10/21/2013   Medicare wellness performed  Labs soon to assess metabolic parameters  Continue dietary/exercise efforts  [place] advised  Vaccines: flu vaccine already given  Advance directive discussed    PROGRESS NOTE  HPI:   Routine f/u  Hx notable for nonischemic CM  Recently rx'd at [facility] for URI (given a zpak with improving sinusitis/cough); CXR done ?pneumonia by pt report  w/o chest pain/abd. discomfort; no increased dyspnea; no pedal edema; denies constitutional complaints of fever, night sweats or wt loss; no evidence of GI/ hemorrhage; chronic BPH sxs. Activity is age appropriate. ROS is otherwise negative. Past Medical History:   Diagnosis Date    Abnormal nuclear cardiac imaging test 01/16/2008    Anterior patchy defect w/minimum ischemia. Inferior, inferior septal, inferoapical scar w/no ischemia. Scar in apex w/minimal ischemia & thinning. EF 27%. Severe global HYK. Gross LVE.     AICD (automatic cardioverter/defibrillator) present     Cardiomyopathy, nonischemic (HCC)     CHF (congestive heart failure) (HCC)     Dizziness     History of depression     History of echocardiogram 12/16/2014    Mod LVE. EF 20-25%. Severe diffuse hypk. Mild conc LVH. Gr 1 DDfx. RVSP 30 mmHg. Mild LAE. Mild MR. Unchanged from study of 9/20/12.  Long term (current) use of anticoagulants 3/1/2011    LV dysfunction     Urolithiasis        Past Surgical History:   Procedure Laterality Date    [facility] AICD CONCERTO BI VENTRIC C684  1/11/06    HX COLONOSCOPY  2005    polyp    HX CYSTECTOMY  1980    on back    HX HEART CATHETERIZATION  2/10/03    HX OTHER SURGICAL      eye surgery at the age of 1    HX OTHER SURGICAL  9/25/2012    lead extraction of [employer]tr[employer] AICD    HX OTHER SURGICAL  5/20/15    AICD generator change out    HX PACEMAKER      HX TONSILLECTOMY      at 4years of age       Social History     Social History    Marital status:      Spouse name: N/A    Number of children: N/A    Years of education: N/A     Occupational History    QA      Social History Main Topics    Smoking status: Current Every Day Smoker     Packs/day: 0.10     Years: 30.00    Smokeless tobacco: Never Used    Alcohol use No    Drug use: No    Sexual activity: Not on file     Other Topics Concern    Not on file     Social History Narrative       Allergies   Allergen Reactions    Codeine Nausea and Vomiting       Family History   Problem Relation Age of Onset    Cancer Father        Current Outpatient Prescriptions   Medication Sig Dispense Refill    albuterol (PROVENTIL HFA, VENTOLIN HFA, PROAIR HFA) 90 mcg/actuation inhaler 2 puffs q 6 hrs prn cough/congestion 1 Inhaler 1    carvedilol (COREG) 25 mg tablet TAKE ONE TABLET BY MOUTH TWICE DAILY WITH MEALS 180 Tab 2    warfarin (COUMADIN) 5 mg tablet TAKE ONE TABLET BY MOUTH ONCE DAILY EXCEPT ON TUESDAY AND THURSDAY TAKE ONE-HALF TABLET 90 Tab 3    ramipril (ALTACE) 10 mg capsule TAKE ONE CAPSULE BY MOUTH ONCE DAILY 90 Cap 3    tamsulosin (FLOMAX) 0.4 mg capsule Take 1 Cap by mouth daily.  Discontinue once stone passes. 7 Cap 0    furosemide (LASIX) 40 mg tablet take 1 tablet by mouth once daily AS NEEDED 20 Tab 3    tadalafil (CIALIS, ADCIRCA) 20 mg tablet Take 1 Tab by mouth as needed. 6 Tab 3           Visit Vitals    /70 (BP 1 Location: Left arm, BP Patient Position: Sitting)    Pulse 74    Temp 96.3 °F (35.7 °C) (Tympanic)    Resp 16    Ht 6' (1.829 m)    Wt 185 lb (83.9 kg)    SpO2 97%    BMI 25.09 kg/m2       PE  Well nourished in NAD  HEENT:   OP: clear. Neck: supple w/o mass or bruits. Chest: clear. CV: RRR w/o m,r,g; pulses intact. Abd: soft, NT, w/o HSM or mass. Rectal: heme neg; prostate 3 FBS and smooth  Ext: w/o edema. Neuro: NF. Assessment and Plan    Encounter Diagnoses   Name Primary?  Routine general medical examination at a health care facility Yes    Cardiomyopathy, nonischemic ([facility] [address]     Chronic systolic congestive heart failure (HCC)     Acute URI    nonischemic CM - management per cardiology  Recent URI/bronchitis - improving with abx rx; albuterol prn  F/U worsening or unimproved 7 days  The patient was counseled on the dangers of tobacco use, and was advised to quit. Reviewed strategies to maximize success, including removing cigarettes and smoking materials from environment. Labs soon to assess metabolic parameters  I have reviewed/discussed the above normal BMI with the patient. I have recommended the following interventions: dietary management education, guidance, and counseling .  .    OV 6 mos or prn  I have explained plan to patient and the patient verbalizes understanding

## 2017-12-07 ENCOUNTER — LAB ONLY (OUTPATIENT)
Dept: INTERNAL MEDICINE CLINIC | Age: 67
End: 2017-12-07

## 2017-12-07 ENCOUNTER — HOSPITAL ENCOUNTER (OUTPATIENT)
Dept: LAB | Age: 67
Discharge: HOME | End: 2017-12-07
Payer: MEDICARE

## 2017-12-07 ENCOUNTER — HOSPITAL ENCOUNTER (OUTPATIENT)
Dept: CT IMAGING | Age: 67
Discharge: HOME | End: 2017-12-07
Attending: INTERNAL MEDICINE
Payer: MEDICARE

## 2017-12-07 ENCOUNTER — ANTI-COAG VISIT (OUTPATIENT)
Dept: CARDIOLOGY CLINIC | Age: 67
End: 2017-12-07

## 2017-12-07 DIAGNOSIS — I42.8 CARDIOMYOPATHY, NONISCHEMIC (HCC): ICD-10-CM

## 2017-12-07 DIAGNOSIS — I50.22 CHRONIC SYSTOLIC CONGESTIVE HEART FAILURE (HCC): ICD-10-CM

## 2017-12-07 DIAGNOSIS — Z00.00 ROUTINE GENERAL MEDICAL EXAMINATION AT A HEALTH CARE FACILITY: Primary | ICD-10-CM

## 2017-12-07 DIAGNOSIS — J90 PLEURAL EFFUSION: ICD-10-CM

## 2017-12-07 DIAGNOSIS — J90 PLEURAL EFFUSION: Primary | ICD-10-CM

## 2017-12-07 DIAGNOSIS — Z79.01 LONG TERM CURRENT USE OF ANTICOAGULANT THERAPY: Primary | ICD-10-CM

## 2017-12-07 LAB
ALBUMIN SERPL-MCNC: 3.7 G/DL (ref 3.4–5)
ALBUMIN/GLOB SERPL: 1 {RATIO} (ref 0.8–1.7)
ALP SERPL-CCNC: 85 U/L (ref 45–117)
ALT SERPL-CCNC: 32 U/L (ref 16–61)
ANION GAP SERPL CALC-SCNC: 6 MMOL/L (ref 3–18)
AST SERPL-CCNC: 22 U/L (ref 15–37)
BASOPHILS # BLD: 0 K/UL (ref 0–0.06)
BASOPHILS NFR BLD: 0 % (ref 0–2)
BILIRUB SERPL-MCNC: 0.9 MG/DL (ref 0.2–1)
BUN SERPL-MCNC: 13 MG/DL (ref 7–18)
BUN/CREAT SERPL: 11 (ref 12–20)
CALCIUM SERPL-MCNC: 9 MG/DL (ref 8.5–10.1)
CHLORIDE SERPL-SCNC: 102 MMOL/L (ref 100–108)
CHOLEST SERPL-MCNC: 157 MG/DL
CO2 SERPL-SCNC: 30 MMOL/L (ref 21–32)
CREAT SERPL-MCNC: 1.16 MG/DL (ref 0.6–1.3)
DIFFERENTIAL METHOD BLD: ABNORMAL
EOSINOPHIL # BLD: 0.7 K/UL (ref 0–0.4)
EOSINOPHIL NFR BLD: 9 % (ref 0–5)
ERYTHROCYTE [DISTWIDTH] IN BLOOD BY AUTOMATED COUNT: 13.3 % (ref 11.6–14.5)
GLOBULIN SER CALC-MCNC: 3.8 G/DL (ref 2–4)
GLUCOSE SERPL-MCNC: 93 MG/DL (ref 74–99)
HCT VFR BLD AUTO: 52.1 % (ref 36–48)
HDLC SERPL-MCNC: 42 MG/DL (ref 40–60)
HDLC SERPL: 3.7 {RATIO} (ref 0–5)
HGB BLD-MCNC: 16.9 G/DL (ref 13–16)
INR BLD: 2.4
LDLC SERPL CALC-MCNC: 100.4 MG/DL (ref 0–100)
LIPID PROFILE,FLP: ABNORMAL
LYMPHOCYTES # BLD: 1.8 K/UL (ref 0.9–3.6)
LYMPHOCYTES NFR BLD: 24 % (ref 21–52)
MCH RBC QN AUTO: 30.5 PG (ref 24–34)
MCHC RBC AUTO-ENTMCNC: 32.4 G/DL (ref 31–37)
MCV RBC AUTO: 93.9 FL (ref 74–97)
MONOCYTES # BLD: 0.7 K/UL (ref 0.05–1.2)
MONOCYTES NFR BLD: 9 % (ref 3–10)
NEUTS SEG # BLD: 4.4 K/UL (ref 1.8–8)
NEUTS SEG NFR BLD: 58 % (ref 40–73)
PLATELET # BLD AUTO: 170 K/UL (ref 135–420)
PMV BLD AUTO: 10.6 FL (ref 9.2–11.8)
POTASSIUM SERPL-SCNC: 5.1 MMOL/L (ref 3.5–5.5)
PROT SERPL-MCNC: 7.5 G/DL (ref 6.4–8.2)
PT POC: 29.2 SECONDS
RBC # BLD AUTO: 5.55 M/UL (ref 4.7–5.5)
SODIUM SERPL-SCNC: 138 MMOL/L (ref 136–145)
TRIGL SERPL-MCNC: 73 MG/DL (ref ?–150)
VALID INTERNAL CONTROL?: YES
VLDLC SERPL CALC-MCNC: 14.6 MG/DL
WBC # BLD AUTO: 7.7 K/UL (ref 4.6–13.2)

## 2017-12-07 PROCEDURE — 80053 COMPREHEN METABOLIC PANEL: CPT | Performed by: INTERNAL MEDICINE

## 2017-12-07 PROCEDURE — 80061 LIPID PANEL: CPT | Performed by: INTERNAL MEDICINE

## 2017-12-07 PROCEDURE — 85025 COMPLETE CBC W/AUTO DIFF WBC: CPT | Performed by: INTERNAL MEDICINE

## 2017-12-07 PROCEDURE — 71250 CT THORAX DX C-: CPT

## 2017-12-07 NOTE — PROGRESS NOTES
Verbal order and read back per Christo Santos NP  The INR is stable and therapeutic.  Continue same dose of coumadin and recheck in 5 weeks with 's appointment (per patient's request)  Continue Coumadin 5mg daily except 2.5mg on Tues and Thurs   Patient informed of instructions, read back and verbalized understanding Kathrine Ortiz LPN

## 2018-01-15 ENCOUNTER — OFFICE VISIT (OUTPATIENT)
Dept: CARDIOLOGY CLINIC | Age: 68
End: 2018-01-15

## 2018-01-15 ENCOUNTER — CLINICAL SUPPORT (OUTPATIENT)
Dept: CARDIOLOGY CLINIC | Age: 68
End: 2018-01-15

## 2018-01-15 ENCOUNTER — ANTI-COAG VISIT (OUTPATIENT)
Dept: CARDIOLOGY CLINIC | Age: 68
End: 2018-01-15

## 2018-01-15 VITALS
HEART RATE: 78 BPM | BODY MASS INDEX: 26.55 KG/M2 | OXYGEN SATURATION: 98 % | WEIGHT: 196 LBS | DIASTOLIC BLOOD PRESSURE: 76 MMHG | HEIGHT: 72 IN | SYSTOLIC BLOOD PRESSURE: 112 MMHG

## 2018-01-15 DIAGNOSIS — T82.110A AICD LEAD MALFUNCTION: ICD-10-CM

## 2018-01-15 DIAGNOSIS — Z79.01 LONG TERM CURRENT USE OF ANTICOAGULANT THERAPY: ICD-10-CM

## 2018-01-15 DIAGNOSIS — I50.22 CHRONIC SYSTOLIC CONGESTIVE HEART FAILURE (HCC): Primary | ICD-10-CM

## 2018-01-15 DIAGNOSIS — I42.8 CARDIOMYOPATHY, NONISCHEMIC (HCC): Primary | ICD-10-CM

## 2018-01-15 DIAGNOSIS — I42.8 CARDIOMYOPATHY, NONISCHEMIC (HCC): ICD-10-CM

## 2018-01-15 LAB
INR BLD: 2.9
PT POC: 35 SECONDS
VALID INTERNAL CONTROL?: YES

## 2018-01-15 NOTE — PROGRESS NOTES
Verbal order and read back per Dr. Sharonda Agee  The INR is stable and therapeutic.  Continue same dose of coumadin and recheck in 6 weeks  Continue Coumadin 5mg daily except 2.5mg on Tues and Thurs   Patient informed of instructions, read back and verbalized understanding Pratibha Parikh LPN

## 2018-01-15 NOTE — PROGRESS NOTES
1. Have you been to the ER, urgent care clinic since your last visit? Hospitalized since your last visit? yes, 2-2-17 kidney stone    2. Have you seen or consulted any other health care providers outside of the [address] [place] [address] since your last visit? Include any pap smears or colon screening.  no

## 2018-01-15 NOTE — PROGRESS NOTES
HPI:  I saw Rhoda Romo in my office today in cardiovascular evaluation regarding his dilated nonischemic cardiomyopathy. Mr. Cristiano High is a very pleasant 69year old white male with history of atypical anginal chest discomfort and an abnormal nuclear myocardial perfusion study back in 2003 showing significant left ventricular dysfunction. His cardiac catheterization at that time demonstrated only mild diffuse coronary artery disease without significant obstructive disease and an ejection fraction that was markedly reduced at 25%. He was placed on Coreg, Altace and Lasix, as well as Coumadin to guard against peripheral embolization from the heart, and he has done very well over the years. He did have an AICD placed in the setting of his worsening left ventricular dysfunction with an upgrade to a biventricular ICD in January of 2006. He just had another generator change a little over a year ago on 5/20/15. He comes in today and relates that he is doing reasonably well. He is remaining fairly active and really denies any cardiovascular symptomatology at this time despite his known severe left ventricular dysfunction. Encounter Diagnoses   Name Primary?  Cardiomyopathy, nonischemic (HCC)     Chronic systolic congestive heart failure ([facility] [address] Yes    AICD lead malfunction        Discussion: This gentleman appears to be doing about as well as we could expect and I really have no recommendations for change at this time. He tells me he had pneumonia a couple months ago in 1 week checked his biventricular ICD we did find that his operative I will had been up at that time suggesting component of concomitant heart failure, but it is coming back down. We did do a full AICD check today and he has 6 years remaining on his battery with 24 nonsustained VT episodes in the past 3 months the longest of which was 4 seconds.     He is on daily Coumadin but his INRs have been therapeutic and actually was 2.9 today, so he seems to be tolerating that medication well. We did have a long discussion about the possibility of using Entresto. He has done very well on his current medical regimen which includes Altace, but I think a case could be made for switching from Altace to [address]. He apparently had a sister who was placed on Entresto and had some improvement in her heart function. I think it would be unlikely that this late date he would get improvement in his heart function but I do think it might be a good medication for him to consider. He relates that he wants to think about it and maybe we will consider making a change when I see him in 6 months. PCP:  Dax Yanez MD       Past Medical History:   Diagnosis Date    Abnormal nuclear cardiac imaging test 01/16/2008    Anterior patchy defect w/minimum ischemia. Inferior, inferior septal, inferoapical scar w/no ischemia. Scar in apex w/minimal ischemia & thinning. EF 27%. Severe global HYK. Gross LVE.  AICD (automatic cardioverter/defibrillator) present     Cardiomyopathy, nonischemic (HCC)     CHF (congestive heart failure) (HCC)     Dizziness     History of depression     History of echocardiogram 12/16/2014    Mod LVE. EF 20-25%. Severe diffuse hypk. Mild conc LVH. Gr 1 DDfx. RVSP 30 mmHg. Mild LAE. Mild MR. Unchanged from study of 9/20/12.     Long term (current) use of anticoagulants 3/1/2011    LV dysfunction     Urolithiasis          Past Surgical History:   Procedure Laterality Date    [facility]. AICD CONCERTO BI VENTRIC Y346  1/11/06    HX COLONOSCOPY  2005    polyp    HX CYSTECTOMY  1980    on back    HX HEART CATHETERIZATION  2/10/03    HX OTHER SURGICAL      eye surgery at the age of 1    HX OTHER SURGICAL  9/25/2012    lead extraction of Medtronic AICD    HX OTHER SURGICAL  5/20/15    AICD generator change out    HX PACEMAKER      HX TONSILLECTOMY      at 4years of age         Current Outpatient Rx   Name  Route  Sig  Dispense Refill    warfarin (COUMADIN) 5 mg tablet        take 1 tablet by mouth daily EXCEPT ON TUESDAY AND THURSDAY TAKE 2.5MG (HALF A TABLET)    90 Tab    3      ramipril (ALTACE) 10 mg capsule        take 1 capsule by mouth once daily    90 Cap    3      furosemide (LASIX) 40 mg tablet        take 1 tablet by mouth once daily AS NEEDED    20 Tab    3      tadalafil (CIALIS, ADCIRCA) 20 mg tablet    Oral    Take 1 Tab by mouth as needed. 6 Tab    3      carvedilol (COREG) 25 mg tablet        take 1 tablet by mouth twice a day with meals    180 Tab    3           Allergies   Allergen Reactions    Codeine Nausea and Vomiting       Social   Social History   Substance Use Topics    Smoking status: Current Every Day Smoker     Packs/day: 0.10     Years: 30.00    Smokeless tobacco: Never Used    Alcohol use No         Family history: family history includes Cancer in his father. Review of Systems:   Constitutional: Negative for chills, fever, malaise/fatigue and weight loss. Respiratory: Negative for cough, hemoptysis, shortness of breath and wheezing. Cardiovascular: Negative for chest pain, palpitations, orthopnea and leg swelling. Gastrointestinal: Negative. Musculoskeletal: Negative for falls, joint pain and myalgias. Neurological: Negative for dizziness. Physical Exam:   The patient is an alert, oriented, well developed, well nourished 69 y.o.  male who was in no acute distress at the time of my examination. Visit Vitals    /76    Pulse 78    Ht 6' (1.829 m)    Wt 88.9 kg (196 lb)    SpO2 98%    BMI 26.58 kg/m2      BP Readings from Last 3 Encounters:   01/15/18 112/76   11/29/17 100/70   05/15/17 98/70        Wt Readings from Last 3 Encounters:   01/15/18 88.9 kg (196 lb)   11/29/17 83.9 kg (185 lb)   05/15/17 87.1 kg (192 lb)       HEENT: Conjunctivae white, mucosa moist, no pallor or cyanosis. Marked deviation of left eye laterally.    Neck: Supple without masses, tenderness or thyromegaly. No jugular venous distention. Carotid upstrokes are full bilaterally, without bruits. Cardiovascular: Chest is symmetrical with good excursion. Defibrillator in left upper chest with well healed scar and one small stitch which I removed. [place] is near [employer] No lifts, heaves or thrills. S1 and S2 are normal, without appreciable murmurs, rubs, clicks or gallops. Defibrillator site is well healed. Lungs: Clear to auscultation in all fields, except for a few rales in the left base. Abdomen: Soft, with no masses, tenderness or organomegaly. Extremities: No edema, with full peripheral pulses. Review of Data: Please refer to past medical history for most recent cardiac testing. Results for orders placed or performed in visit on 01/15/18   AMB POC EKG ROUTINE W/ 12 LEADS, INTER & REP     Status: None    Narrative    AV sequentially paced rhythm with a rate of 78. Oliver Damian D.O., F.A.C.C. [facility]  [facility]  [facility] [address]    PLEASE NOTE:  This document has been produced using voice recognition software. Unrecognized errors in transcription may be present.

## 2018-01-15 NOTE — MR AVS SNAPSHOT
Visit Information Date & Time Provider Department Dept. Phone Encounter #  
 1/15/2018 11:00 AM Britt Franco DO [facility] [id number] Follow-up Instructions Return in about 6 months (around 7/15/2018). Your Appointments 2/26/2018 11:00 AM  
ANTICOAG NURSE with Pt Inr Hv Csi [facility] ([facility]) Appt Note: 6 week INR  
 [place] [address] [address] [address] [phone] [address] [place] [address] [address] Upcoming Health Maintenance Date Due Hepatitis C Screening 1950 FOBT Q 1 YEAR AGE 50-75 10/21/1998 DTaP/Tdap/Td series (1 - Tdap) 1/28/2003 ZOSTER VACCINE AGE 60> 8/21/2008 GLAUCOMA SCREENING Q2Y 10/21/2013 MEDICARE YEARLY EXAM 11/30/2018 Allergies as of 1/15/2018  Review Complete On: 1/15/2018 By: Britt Franco DO Severity Noted Reaction Type Reactions Codeine Low 10/30/2015   Intolerance Nausea and Vomiting Current Immunizations  Never Reviewed Name Date Influenza High Dose Vaccine PF 9/25/2017, 10/15/2016 Pneumococcal Conjugate (PCV-13) 10/15/2016 Pneumococcal Polysaccharide (PPSV-23) 11/9/2017, 12/1/2010 Td 1/27/2003 Not reviewed this visit You Were Diagnosed With   
  
 Codes Comments Chronic systolic congestive heart failure (HCC)    -  Primary ICD-10-CM: Y77.95 ICD-9-CM: 428.22, 428.0 Cardiomyopathy, nonischemic ([facility] [address]     ICD-10-CM: I42.8 ICD-9-CM: 425.4 AICD lead malfunction     ICD-10-CM: T82.110A 
ICD-9-CM: 996.04 Vitals BP Pulse Height(growth percentile) Weight(growth percentile) SpO2 BMI  
 112/76 78 6' (1.829 m) 196 lb (88.9 kg) 98% 26.58 kg/m2 Smoking Status Current Every Day Smoker Vitals History BMI and BSA Data  Body Mass Index Body Surface Area  
 26.58 kg/m 2 2.13 m 2  
  
  
 Preferred Pharmacy Pharmacy Name Phone [address], [address] [facility] [phone] Your Updated Medication List  
  
   
This list is accurate as of: 1/15/18 11:23 AM.  Always use your most recent med list.  
  
  
  
  
 albuterol 90 mcg/actuation inhaler Commonly known as:  PROVENTIL HFA, VENTOLIN HFA, PROAIR HFA  
2 puffs q 6 hrs prn cough/congestion  
  
 carvedilol 25 mg tablet Commonly known as:  COREG  
TAKE ONE TABLET BY MOUTH TWICE DAILY WITH MEALS  
  
 furosemide 40 mg tablet Commonly known as:  LASIX  
take 1 tablet by mouth once daily AS NEEDED  
  
 ramipril 10 mg capsule Commonly known as:  ALTACE  
TAKE ONE CAPSULE BY MOUTH ONCE DAILY  
  
 tadalafil 20 mg tablet Commonly known as:  CIALIS Take 1 Tab by mouth as needed. tamsulosin 0.4 mg capsule Commonly known as:  FLOMAX Take 1 Cap by mouth daily. Discontinue once stone passes. warfarin 5 mg tablet Commonly known as:  COUMADIN  
TAKE ONE TABLET BY MOUTH ONCE DAILY EXCEPT ON TUESDAY AND THURSDAY TAKE ONE-HALF TABLET We Performed the Following AMB POC EKG ROUTINE W/ 12 LEADS, INTER & REP [15631 CPT(R)] Follow-up Instructions Return in about 6 months (around 7/15/2018). Please provide this summary of care documentation to your next provider. Your primary care clinician is listed as Monroe Andre. If you have any questions after today's visit, please call [phone].

## 2018-01-21 NOTE — PROGRESS NOTES
I have personally seen and evaluated the device findings. Interrogation reviewed and I agree with assessment.     Jessica Salgado

## 2018-03-05 ENCOUNTER — ANTI-COAG VISIT (OUTPATIENT)
Dept: CARDIOLOGY CLINIC | Age: 68
End: 2018-03-05

## 2018-03-05 DIAGNOSIS — Z79.01 LONG TERM CURRENT USE OF ANTICOAGULANT THERAPY: Primary | ICD-10-CM

## 2018-03-05 DIAGNOSIS — I42.8 CARDIOMYOPATHY, NONISCHEMIC (HCC): ICD-10-CM

## 2018-03-05 LAB
INR BLD: 3
PT POC: 35.6 SECONDS
VALID INTERNAL CONTROL?: YES

## 2018-03-05 NOTE — PROGRESS NOTES
Verbal order and read back per Kanika Chan NP   2.5 mg x 1 then Continue Coumadin 5mg daily except 2.5mg on Tues and Thurs   Recheck in 6 weeks ( ordering home monitor for patient today)

## 2018-03-19 RX ORDER — FUROSEMIDE 40 MG/1
TABLET ORAL
Qty: 30 TAB | Refills: 3 | Status: SHIPPED | OUTPATIENT
Start: 2018-03-19 | End: 2018-06-25 | Stop reason: SDUPTHER

## 2018-03-26 RX ORDER — RAMIPRIL 10 MG/1
CAPSULE ORAL
Qty: 90 CAP | Refills: 3 | Status: SHIPPED | OUTPATIENT
Start: 2018-03-26 | End: 2018-07-16 | Stop reason: ALTCHOICE

## 2018-04-02 ENCOUNTER — TELEPHONE ANTICOAG (OUTPATIENT)
Dept: CARDIOLOGY CLINIC | Age: 68
End: 2018-04-02

## 2018-04-02 DIAGNOSIS — Z79.01 LONG TERM CURRENT USE OF ANTICOAGULANT THERAPY: Primary | ICD-10-CM

## 2018-04-02 DIAGNOSIS — I42.8 CARDIOMYOPATHY, NONISCHEMIC (HCC): ICD-10-CM

## 2018-04-02 LAB — INR, EXTERNAL: 3

## 2018-04-02 NOTE — PROGRESS NOTES
Verbal order and read back per Miquel Edmond, ANNELISE  Patient is within therapeutic range   Continue Coumadin 5mg daily except 2.5mg on Tues and Thurs   Recheck in 4 weeks  This has been fully explained to the patient, who indicates understanding.

## 2018-05-02 LAB — INR, EXTERNAL: 2.9

## 2018-05-03 ENCOUNTER — TELEPHONE ANTICOAG (OUTPATIENT)
Dept: CARDIOLOGY CLINIC | Age: 68
End: 2018-05-03

## 2018-05-03 DIAGNOSIS — I42.8 CARDIOMYOPATHY, NONISCHEMIC (HCC): ICD-10-CM

## 2018-05-03 DIAGNOSIS — Z79.01 LONG TERM CURRENT USE OF ANTICOAGULANT THERAPY: Primary | ICD-10-CM

## 2018-05-03 NOTE — PROGRESS NOTES
Verbal order and read back per Ros Whitman, NP   Patient is within therapeutic range  Continue Coumadin 5mg daily except 2.5mg on Tues and Thurs   Recheck 4 weeks  This has been fully explained to the patient, who indicates understanding.

## 2018-06-04 ENCOUNTER — TELEPHONE ANTICOAG (OUTPATIENT)
Dept: CARDIOLOGY CLINIC | Age: 68
End: 2018-06-04

## 2018-06-04 DIAGNOSIS — Z79.01 LONG TERM CURRENT USE OF ANTICOAGULANT THERAPY: Primary | ICD-10-CM

## 2018-06-04 DIAGNOSIS — I42.8 CARDIOMYOPATHY, NONISCHEMIC (HCC): ICD-10-CM

## 2018-06-04 LAB — INR, EXTERNAL: 1.8

## 2018-06-06 ENCOUNTER — TELEPHONE (OUTPATIENT)
Dept: CARDIOLOGY CLINIC | Age: 68
End: 2018-06-06

## 2018-06-06 NOTE — TELEPHONE ENCOUNTER
Pt's wife called in today concerned about her . She states that the palms of his hands have been red for \"quite sometime\", that he is dizzy some days and has been having some SOB that has been progressively getting worse since November. He's also had a productive and dry cough at times. She has also noticed him sleeping all the time.  Will defer to Dr. Megan Adams for further eval

## 2018-06-07 ENCOUNTER — OFFICE VISIT (OUTPATIENT)
Dept: CARDIOLOGY CLINIC | Age: 68
End: 2018-06-07

## 2018-06-07 ENCOUNTER — HOSPITAL ENCOUNTER (OUTPATIENT)
Dept: LAB | Age: 68
Discharge: HOME | End: 2018-06-07
Payer: MEDICARE

## 2018-06-07 VITALS
DIASTOLIC BLOOD PRESSURE: 80 MMHG | HEIGHT: 72 IN | BODY MASS INDEX: 25.33 KG/M2 | HEART RATE: 69 BPM | SYSTOLIC BLOOD PRESSURE: 114 MMHG | OXYGEN SATURATION: 96 % | WEIGHT: 187 LBS

## 2018-06-07 DIAGNOSIS — I42.8 CARDIOMYOPATHY, NONISCHEMIC (HCC): ICD-10-CM

## 2018-06-07 DIAGNOSIS — R53.83 FATIGUE, UNSPECIFIED TYPE: ICD-10-CM

## 2018-06-07 DIAGNOSIS — I50.22 CHRONIC SYSTOLIC CONGESTIVE HEART FAILURE (HCC): ICD-10-CM

## 2018-06-07 DIAGNOSIS — R06.02 SHORTNESS OF BREATH: ICD-10-CM

## 2018-06-07 DIAGNOSIS — R06.02 SHORTNESS OF BREATH: Primary | ICD-10-CM

## 2018-06-07 DIAGNOSIS — I10 ESSENTIAL HYPERTENSION WITH GOAL BLOOD PRESSURE LESS THAN 130/85: ICD-10-CM

## 2018-06-07 LAB
ANION GAP SERPL CALC-SCNC: 7 MMOL/L (ref 3–18)
BNP SERPL-MCNC: 4523 PG/ML (ref 0–900)
BUN SERPL-MCNC: 14 MG/DL (ref 7–18)
BUN/CREAT SERPL: 13 (ref 12–20)
CALCIUM SERPL-MCNC: 9.3 MG/DL (ref 8.5–10.1)
CHLORIDE SERPL-SCNC: 100 MMOL/L (ref 100–108)
CO2 SERPL-SCNC: 32 MMOL/L (ref 21–32)
CREAT SERPL-MCNC: 1.12 MG/DL (ref 0.6–1.3)
GLUCOSE SERPL-MCNC: 79 MG/DL (ref 74–99)
POTASSIUM SERPL-SCNC: 4.6 MMOL/L (ref 3.5–5.5)
SODIUM SERPL-SCNC: 139 MMOL/L (ref 136–145)

## 2018-06-07 PROCEDURE — 80048 BASIC METABOLIC PNL TOTAL CA: CPT | Performed by: NURSE PRACTITIONER

## 2018-06-07 PROCEDURE — 36415 COLL VENOUS BLD VENIPUNCTURE: CPT | Performed by: NURSE PRACTITIONER

## 2018-06-07 PROCEDURE — 83880 ASSAY OF NATRIURETIC PEPTIDE: CPT | Performed by: NURSE PRACTITIONER

## 2018-06-07 NOTE — MR AVS SNAPSHOT
[address] [address] [address] [address] [phone] Patient: Namrata Dailey MRN: [id number] :1950 Visit Information Date & Time Provider Department Dept. Phone Encounter #  
 2018  2:30 PM Willow Riley NP [facility] \[facility]\"" [phone] [id number] Your Appointments 2018 11:00 AM  
Follow Up with Kimball Cranker, DO [facility] \[facility] [place] [facility]\"" ([address]) Appt Note: 6 month f/up [place] [address] [address] [address] [phone] [address] [facility] [address] [address] [facility] [address] Upcoming Health Maintenance Date Due Hepatitis C Screening 1950 FOBT Q 1 YEAR AGE 50-75 10/21/1998 DTaP/Tdap/Td series (1 - Tdap) 2003 ZOSTER VACCINE AGE 60> 2008 GLAUCOMA SCREENING Q2Y 10/21/2013 Influenza Age 5 to Adult 2018 MEDICARE YEARLY EXAM 2018 Allergies as of 2018  Review Complete On: 2018 By: Willow Riley NP Severity Noted Reaction Type Reactions Codeine Low 10/30/2015   Intolerance Nausea and Vomiting Current Immunizations  Never Reviewed Name Date Influenza High Dose Vaccine PF 2017, 10/15/2016 Pneumococcal Conjugate (PCV-13) 10/15/2016 Pneumococcal Polysaccharide (PPSV-23) 2017, 2010 Td 2003 Not reviewed this visit You Were Diagnosed With   
  
 Codes Comments Shortness of breath    -  Primary ICD-10-CM: R06.02 
ICD-9-CM: 786.05 Cardiomyopathy, nonischemic ([facility] [address]     ICD-10-CM: I42.8 ICD-9-CM: 425. 4 Chronic systolic congestive heart failure (HCC)     ICD-10-CM: I50.22 ICD-9-CM: 428.22, 428.0 Essential hypertension with goal blood pressure less than 130/85     ICD-10-CM: I10 
ICD-9-CM: 401.9 Fatigue, unspecified type     ICD-10-CM: R53.83 ICD-9-CM: 780.79   
  
 Vitals BP Pulse Height(growth percentile) Weight(growth percentile) SpO2 BMI  
 114/80 69 6' (1.829 m) 187 lb (84.8 kg) 96% 25.36 kg/m2 Smoking Status Current Every Day Smoker Vitals History BMI and BSA Data Body Mass Index Body Surface Area  
 25.36 kg/m 2 2.08 m 2 Preferred Pharmacy Pharmacy Name Phone [address], [address] [place] [address] [phone] Your Updated Medication List  
  
   
This list is accurate as of 6/7/18  3:19 PM.  Always use your most recent med list.  
  
  
  
  
 albuterol 90 mcg/actuation inhaler Commonly known as:  PROVENTIL HFA, VENTOLIN HFA, PROAIR HFA  
2 puffs q 6 hrs prn cough/congestion  
  
 carvedilol 25 mg tablet Commonly known as:  COREG  
TAKE ONE TABLET BY MOUTH TWICE DAILY WITH MEALS  
  
 furosemide 40 mg tablet Commonly known as:  LASIX  
take 1 tablet by mouth once daily AS NEEDED  
  
 ramipril 10 mg capsule Commonly known as:  ALTACE  
TAKE ONE CAPSULE BY MOUTH ONCE DAILY  
  
 warfarin 5 mg tablet Commonly known as:  COUMADIN  
TAKE ONE TABLET BY MOUTH ONCE DAILY EXCEPT ON TUESDAY AND THURSDAY TAKE ONE-HALF TABLET We Performed the Following AMB POC EKG ROUTINE W/ 12 LEADS, INTER & REP [86059 CPT(R)] To-Do List   
 06/07/2018 Lab:  METABOLIC PANEL, BASIC   
  
 06/07/2018 Lab:  NT-PRO BNP   
  
 06/14/2018 ECHO:  2D ECHO COMPLETE ADULT (TTE) W OR WO CONTR Around 06/14/2018 ECG:  CARDIAC SPECT REST AND STRESS   
  
 06/14/2018 ECG:  NUCLEAR STRESS TEST Patient Instructions BMP and NT pro-BNP today Take Lasix 40mg daily for 3 days and then back to as needed Pharmacologic nuclear stress test; Dx:  Fatigue, shortness of breath, increased somnolence Echocardiogram; Dx:  Fatigue, shortness of breath, increased somnolence Follow-up with Dr. Fercho Figueroa as scheduled and as needed Daily weights in the morning Low sodium diet, 2000mg per day Please provide this summary of care documentation to your next provider. Your primary care clinician is listed as Sade Villela. If you have any questions after today's visit, please call [phone].

## 2018-06-07 NOTE — PROGRESS NOTES
Shanell Lucas presents today for evaluation of complaints of increasing shortness of breath, fatigue, and increased somnolence over the past several months. He states that he had been having some sinus issues lately with postnasal drip which was causing a cough but he began taking an over-the-counter medication which has improved those symptoms significantly. He is accompanied today by his wife who states that he normally does not allow her to come back with him for his visits but this time, he let her. She states that she and other family members have noticed his symptoms and they have been concerned and she came back with him today to make sure that he reported everything that they have been noticing. Reviewing his medications, he states that he usually just uses his furosemide on an as-needed basis and the symptoms that he has had lately,are not similar to the past times when he has had to use the Lasix. He is a 64-year-old  male with history of ischemic cardiomyopathy and chronic systolic heart failure. He also has history of atypical anginal chest discomfort and an abnormal nuclear myocardial perfusion study done in 2003 showed significant left ventricular dysfunction. His cardiac catheterization at that time demonstrated only mild diffuse coronary artery disease without significant obstructive disease and an ejection fraction that was markedly reduced at 25%. His last stress test was done in Jan. 2008. He is s/p AICD implant with upgrade to a Bi-V/ICD in Jan. 2006 with generator change in May 2015. He denies chest pain, tightness, heaviness, and palpitations. Denies shortness of breath at rest, admits to dyspnea on exertion, denies orthopnea and PND. Denies abdominal bloating. Denies lightheadedness, dizziness, and syncope. Denies lower extremity edema and claudication. Denies nausea, vomiting, diarrhea, melena, hematochezia. Denies hematuria, urgency, frequency.   Denies fever, chills. He complains of increasing fatigue and somnolence. PMH:  Past Medical History:   Diagnosis Date    Abnormal nuclear cardiac imaging test 01/16/2008    Anterior patchy defect w/minimum ischemia. Inferior, inferior septal, inferoapical scar w/no ischemia. Scar in apex w/minimal ischemia & thinning. EF 27%. Severe global HYK. Gross LVE.  AICD (automatic cardioverter/defibrillator) present     Cardiomyopathy, nonischemic (HCC)     CHF (congestive heart failure) (HCC)     Dizziness     History of depression     History of echocardiogram 12/16/2014    Mod LVE. EF 20-25%. Severe diffuse hypk. Mild conc LVH. Gr 1 DDfx. RVSP 30 mmHg. Mild LAE. Mild MR. Unchanged from study of 9/20/12.  Long term (current) use of anticoagulants 3/1/2011    LV dysfunction     Urolithiasis        PSH:  Past Surgical History:   Procedure Laterality Date    [facility]. AICD CONCERTO BI VENTRIC U330  1/11/06    HX COLONOSCOPY  2005    polyp    HX CYSTECTOMY  1980    on back    HX HEART CATHETERIZATION  2/10/03    HX OTHER SURGICAL      eye surgery at the age of 1    HX OTHER SURGICAL  9/25/2012    lead extraction of [employer] AICD    HX OTHER SURGICAL  5/20/15    AICD generator change out    HX PACEMAKER      HX TONSILLECTOMY      at 4years of age       MEDS:  Current Outpatient Prescriptions   Medication Sig    ramipril (ALTACE) 10 mg capsule TAKE ONE CAPSULE BY MOUTH ONCE DAILY    furosemide (LASIX) 40 mg tablet take 1 tablet by mouth once daily AS NEEDED    albuterol (PROVENTIL HFA, VENTOLIN HFA, PROAIR HFA) 90 mcg/actuation inhaler 2 puffs q 6 hrs prn cough/congestion    carvedilol (COREG) 25 mg tablet TAKE ONE TABLET BY MOUTH TWICE DAILY WITH MEALS    warfarin (COUMADIN) 5 mg tablet TAKE ONE TABLET BY MOUTH ONCE DAILY EXCEPT ON TUESDAY AND THURSDAY TAKE ONE-HALF TABLET     No current facility-administered medications for this visit.             Allergies and Sensitivities:  Allergies   Allergen Reactions    Codeine Nausea and Vomiting       Family History:  Family History   Problem Relation Age of Onset    Cancer Father        Social History:  He  reports that he has been smoking. He has a 3.00 pack-year smoking history. He has never used smokeless tobacco.  He  reports that he does not drink alcohol. Physical:  Visit Vitals    /80    Pulse 69    Ht 6' (1.829 m)    Wt 84.8 kg (187 lb)    SpO2 96%    BMI 25.36 kg/m2       He is down 9 pounds since his last visit (states that he is trying to lose weight)    Exam:  Neck:  Supple, no JVD, no carotid bruits  CV:  Normal S1 and  S2, no murmurs, rubs, or gallops noted, AICD in left upper chest  Lungs:  Clear to ausculation throughout, no wheezes or rales  Abd:  Soft, non-tender, non-distended with good bowel sounds. No hepatosplenomegaly  Extremities:  No edema      Data:  EKG:  Read by Michael Hsu DO - Electronic ventricular pacemaker.  Pacemaker ECG.  No further analysis. LABS:  Lab Results   Component Value Date/Time    Sodium 138 12/07/2017 01:06 PM    Potassium 5.1 12/07/2017 01:06 PM    Chloride 102 12/07/2017 01:06 PM    CO2 30 12/07/2017 01:06 PM    Glucose 93 12/07/2017 01:06 PM    BUN 13 12/07/2017 01:06 PM    Creatinine 1.16 12/07/2017 01:06 PM     Lab Results   Component Value Date/Time    Cholesterol, total 157 12/07/2017 01:06 PM    HDL Cholesterol 42 12/07/2017 01:06 PM    LDL, calculated 100.4 (H) 12/07/2017 01:06 PM    Triglyceride 73 12/07/2017 01:06 PM    CHOL/HDL Ratio 3.7 12/07/2017 01:06 PM     Lab Results   Component Value Date/Time    ALT (SGPT) 32 12/07/2017 01:06 PM         Impression/Plan:  1. Fatigue  2. Dyspnea on exertion  3. Non-ischemic cardiomyopathy, last EF 20-25% (echo done in 2014)  4. Chronic systolic heart failure    Mr. Abdulkadir Meng was seen today for evaluation of complaints of increasing fatigue and dyspnea on exertion over the past several months.   His wife and other family members have noticed and are concerned. He has not used any lasix lately as he states that the dyspnea he is experiencing is not similar to when he has had to use lasix in the past.  He denies chest pain and tightness. He has lost 9 pounds since his last visit but he states that he has been trying to lose some weight. His wife feels that he has not had a good appetite lately. He has been having some sinus issues lately with postnasal drip which was causing a cough but he began taking an over-the-counter medication which has improved those symptoms significantly. His blood pressure and heart rate are stable. His device was interrogated today and it showed that his Opti-Vol level is below threshold but was increasing slightly. He has had very brief runs of nonsustained VT and his device is functioning appropriately. His last stress test was performed in Jan. 2008, which showed anterior patchy defect with minimum ischemia, EF 27%, and severe global hypokinesia. His last echo was done in December 2014 and it showed an EF of 20-25% (unchanged since 2012). He was instructed to take Lasix 40mg for 2 days only and then back to as needed. He was given a lab slip for a BMP and NT pro-BNP to be done today. He will be scheduled for a pharmacologic nuclear stress test and echocardiogram (dx. Fatigue, HERNANDEZ, and increased somnolence). He will follow-up with Dr. Trina Pabon as scheduled for July 2018 and as needed. Minesh Mederos MSN, FNP-BC    Please note:  Portions of this chart were created with Dragon medical speech to text program.  Unrecognized errors may be present.

## 2018-06-07 NOTE — PROGRESS NOTES
1. Have you been to the ER, urgent care clinic since your last visit? Hospitalized since your last visit? No    2. Have you seen or consulted any other health care providers outside of the [address] [place] [address] since your last visit? Include any pap smears or colon screening.  No

## 2018-06-11 RX ORDER — POTASSIUM CHLORIDE 750 MG/1
10 TABLET, EXTENDED RELEASE ORAL DAILY
Qty: 30 TAB | Refills: 6 | Status: SHIPPED | OUTPATIENT
Start: 2018-06-11 | End: 2018-07-16 | Stop reason: ALTCHOICE

## 2018-06-11 NOTE — TELEPHONE ENCOUNTER
----- Message from Ga Hanna NP sent at 6/11/2018  9:12 AM EDT -----  Omari Colunga,    Please let him know that NT pro-BNP was elevated and he will need to take the lasix 40mg daily and add 10meq potassium chloride daily. Please schedule him to see me in about 2 weeks or whatever is available sooner. He is scheduled to see Twyla Im in July already. Thanks,  Grant Atkins  ----- Message -----     From: Lesvia Aguilar In [place]     Sent: 6/7/2018   8:57 PM       To:  Ga Hanna NP

## 2018-06-14 ENCOUNTER — HOSPITAL ENCOUNTER (OUTPATIENT)
Dept: NON INVASIVE DIAGNOSTICS | Age: 68
Discharge: HOME | End: 2018-06-14
Attending: NURSE PRACTITIONER
Payer: MEDICARE

## 2018-06-14 DIAGNOSIS — I42.8 CARDIOMYOPATHY, NONISCHEMIC (HCC): ICD-10-CM

## 2018-06-14 DIAGNOSIS — R53.83 FATIGUE, UNSPECIFIED TYPE: ICD-10-CM

## 2018-06-14 DIAGNOSIS — I10 ESSENTIAL HYPERTENSION WITH GOAL BLOOD PRESSURE LESS THAN 130/85: ICD-10-CM

## 2018-06-14 DIAGNOSIS — R06.02 SHORTNESS OF BREATH: ICD-10-CM

## 2018-06-14 LAB
ATTENDING PHYSICIAN, CST07: NORMAL
DIAGNOSIS, 93000: NORMAL
DUKE TM SCORE RESULT, CST14: NORMAL
DUKE TREADMILL SCORE, CST13: NORMAL
ECG INTERP BEFORE EX, CST11: NORMAL
ECG INTERP DURING EX, CST12: NORMAL
FUNCTIONAL CAPACITY, CST17: NORMAL
KNOWN CARDIAC CONDITION, CST08: NORMAL
MAX. DIASTOLIC BP, CST04: 50 MMHG
MAX. HEART RATE, CST05: 83 BPM
MAX. SYSTOLIC BP, CST03: 81 MMHG
OVERALL BP RESPONSE TO EXERCISE, CST16: NORMAL
OVERALL HR RESPONSE TO EXERCISE, CST15: NORMAL
PEAK EX METS, CST10: 1 METS
PROTOCOL NAME, CST01: NORMAL
REASON FOR TERMINATION: 60 BPM
TEST INDICATION, CST09: NORMAL
TIME IN EXERCISE PHASE, CST02: NORMAL

## 2018-06-14 PROCEDURE — 93017 CV STRESS TEST TRACING ONLY: CPT

## 2018-06-14 PROCEDURE — 78452 HT MUSCLE IMAGE SPECT MULT: CPT

## 2018-06-14 PROCEDURE — A9500 TC99M SESTAMIBI: HCPCS

## 2018-06-14 PROCEDURE — 93306 TTE W/DOPPLER COMPLETE: CPT

## 2018-06-14 PROCEDURE — 74011000258 HC RX REV CODE- 258: Performed by: NURSE PRACTITIONER

## 2018-06-14 PROCEDURE — 74011250636 HC RX REV CODE- 250/636: Performed by: NURSE PRACTITIONER

## 2018-06-14 RX ORDER — SODIUM CHLORIDE 9 MG/ML
100 INJECTION, SOLUTION INTRAVENOUS ONCE
Status: COMPLETED | OUTPATIENT
Start: 2018-06-14 | End: 2018-06-14

## 2018-06-14 RX ADMIN — SODIUM CHLORIDE 100 ML/HR: 900 INJECTION, SOLUTION INTRAVENOUS at 09:50

## 2018-06-14 RX ADMIN — REGADENOSON 0.4 MG: 0.08 INJECTION, SOLUTION INTRAVENOUS at 09:50

## 2018-06-14 NOTE — PROCEDURES
[address] MED CARDIAC STRESS    Stalin Trevizo  MR#: [id number]  : 1950  ACCOUNT #: [de-identified]   DATE OF SERVICE: 2018    PROCEDURE:  Pharmacological cardiac nuclear stress test.    INDICATION:  Shortness of breath. BASELINE EKG:  Electronic ventricular pacing. PROTOCOL:  The patient was given Lexiscan infusion to the left antecubital IV. 11 mCi sestamibi was injected for rest and 30 mCi sestamibi injected for stress. Gated processing was performed. Resting blood pressure 80/58 mmHg, did not significantly changed. EKG FINDINGS:  The patient remained ventricular paced. NUCLEAR FINDINGS:  Left ventricular systolic function severely depressed, calculated at 16%. Left ventricle is massively dilated. There is patchy radiotracer uptake during both rest and stress, without obvious ischemia or previous infarct cannot be excluded. IMPRESSION:  1. Abnormal and high risk pharmacological cardiac nuclear stress test.  2.  Severely depressed left ventricular systolic function, 13% with a severely dilated left ventricle. 3.  Patchy radiotracer uptake without obvious ischemia or previous infarct.       MD LUIS ALBERTO Naranjo / [place]  D: 2018 12:31     T: 2018 12:48  JOB #: [id number]

## 2018-06-19 RX ORDER — CARVEDILOL 25 MG/1
TABLET ORAL
Qty: 180 TAB | Refills: 3 | OUTPATIENT
Start: 2018-06-19 | End: 2018-09-03

## 2018-06-25 ENCOUNTER — OFFICE VISIT (OUTPATIENT)
Dept: CARDIOLOGY CLINIC | Age: 68
End: 2018-06-25

## 2018-06-25 ENCOUNTER — HOSPITAL ENCOUNTER (OUTPATIENT)
Dept: LAB | Age: 68
Discharge: HOME | End: 2018-06-25
Payer: MEDICARE

## 2018-06-25 VITALS
HEART RATE: 68 BPM | HEIGHT: 72 IN | OXYGEN SATURATION: 94 % | SYSTOLIC BLOOD PRESSURE: 118 MMHG | BODY MASS INDEX: 24.92 KG/M2 | DIASTOLIC BLOOD PRESSURE: 64 MMHG | WEIGHT: 184 LBS

## 2018-06-25 DIAGNOSIS — I50.22 CHRONIC SYSTOLIC CONGESTIVE HEART FAILURE (HCC): ICD-10-CM

## 2018-06-25 DIAGNOSIS — I42.8 CARDIOMYOPATHY, NONISCHEMIC (HCC): ICD-10-CM

## 2018-06-25 DIAGNOSIS — R06.02 SHORTNESS OF BREATH: ICD-10-CM

## 2018-06-25 DIAGNOSIS — R06.02 SHORTNESS OF BREATH: Primary | ICD-10-CM

## 2018-06-25 LAB
ANION GAP SERPL CALC-SCNC: 4 MMOL/L (ref 3–18)
BNP SERPL-MCNC: 3136 PG/ML (ref 0–900)
BUN SERPL-MCNC: 22 MG/DL (ref 7–18)
BUN/CREAT SERPL: 15 (ref 12–20)
CALCIUM SERPL-MCNC: 9.3 MG/DL (ref 8.5–10.1)
CHLORIDE SERPL-SCNC: 100 MMOL/L (ref 100–108)
CO2 SERPL-SCNC: 33 MMOL/L (ref 21–32)
CREAT SERPL-MCNC: 1.42 MG/DL (ref 0.6–1.3)
GLUCOSE SERPL-MCNC: 84 MG/DL (ref 74–99)
POTASSIUM SERPL-SCNC: 5.4 MMOL/L (ref 3.5–5.5)
SODIUM SERPL-SCNC: 137 MMOL/L (ref 136–145)

## 2018-06-25 PROCEDURE — 80048 BASIC METABOLIC PNL TOTAL CA: CPT | Performed by: NURSE PRACTITIONER

## 2018-06-25 PROCEDURE — 83880 ASSAY OF NATRIURETIC PEPTIDE: CPT | Performed by: NURSE PRACTITIONER

## 2018-06-25 PROCEDURE — 36415 COLL VENOUS BLD VENIPUNCTURE: CPT | Performed by: NURSE PRACTITIONER

## 2018-06-25 RX ORDER — FUROSEMIDE 40 MG/1
40 TABLET ORAL DAILY
Qty: 1 TAB | Refills: 0
Start: 2018-06-25 | End: 2018-07-16 | Stop reason: ALTCHOICE

## 2018-06-25 NOTE — PATIENT INSTRUCTIONS
BMP and NT pro-BNP today  Continue present medication regimen  Follow-up with Dr. Ivett Talavera as scheduled and as needed

## 2018-06-25 NOTE — PROGRESS NOTES
1. Have you been to the ER, urgent care clinic since your last visit? Hospitalized since your last visit? No    2. Have you seen or consulted any other health care providers outside of the [address] [place] [address] since your last visit? Include any pap smears or colon screening.  No

## 2018-06-25 NOTE — MR AVS SNAPSHOT
[address] [address] [address] [address] [phone] Patient: Dwayne Humphries MRN: [id number] :1950 Visit Information Date & Time Provider Department Dept. Phone Encounter #  
 2018 10:00 AM Vannessa Baugh NP [facility] [address] [id number] Your Appointments 2018 11:00 AM  
Follow Up with Adelia Manley DO [facility] ([facility]) Appt Note: 6 month f/up [place] [address] [address] [id number] [phone] 1212 [facility] [address] 2018 11:00 AM  
PROCEDURE with Pacer Sissy Csi [facility] ([facility]) Appt Note: w/skillen appt [facility] [address] [address] [id number] [phone] 1212 [facility] [address] Upcoming Health Maintenance Date Due Hepatitis C Screening 1950 FOBT Q 1 YEAR AGE 50-75 10/21/1998 DTaP/Tdap/Td series (1 - Tdap) 2003 ZOSTER VACCINE AGE 60> 2008 GLAUCOMA SCREENING Q2Y 10/21/2013 Influenza Age 5 to Adult 2018 MEDICARE YEARLY EXAM 2018 Allergies as of 2018  Review Complete On: 2018 By: Vannessa Baugh NP Severity Noted Reaction Type Reactions Codeine Low 10/30/2015   Intolerance Nausea and Vomiting Current Immunizations  Never Reviewed Name Date Influenza High Dose Vaccine PF 2017, 10/15/2016 Pneumococcal Conjugate (PCV-13) 10/15/2016 Pneumococcal Polysaccharide (PPSV-23) 2017, 2010 Td 2003 Not reviewed this visit You Were Diagnosed With   
  
 Codes Comments Shortness of breath    -  Primary ICD-10-CM: R06.02 
ICD-9-CM: 786.05 Cardiomyopathy, nonischemic ([facility]     ICD-10-CM: I42.8 ICD-9-CM: 425. 4 Chronic systolic congestive heart failure (HCC)     ICD-10-CM: I50.22 ICD-9-CM: 428.22, 428.0 Vitals BP Pulse Height(growth percentile) Weight(growth percentile) SpO2 BMI  
 118/64 68 6' (1.829 m) 184 lb (83.5 kg) 94% 24.95 kg/m2 Smoking Status Current Every Day Smoker Vitals History BMI and BSA Data Body Mass Index Body Surface Area 24.95 kg/m 2 2.06 m 2 Preferred Pharmacy Pharmacy Name Phone [facility], [address] [facility] [phone] Your Updated Medication List  
  
   
This list is accurate as of 6/25/18 10:33 AM.  Always use your most recent med list.  
  
  
  
  
 albuterol 90 mcg/actuation inhaler Commonly known as:  PROVENTIL HFA, VENTOLIN HFA, PROAIR HFA  
2 puffs q 6 hrs prn cough/congestion  
  
 carvedilol 25 mg tablet Commonly known as:  COREG  
TAKE ONE TABLET BY MOUTH TWICE DAILY WITH MEALS  
  
 furosemide 40 mg tablet Commonly known as:  LASIX Take 1 Tab by mouth daily. potassium chloride 10 mEq tablet Commonly known as:  KLOR-CON Take 1 Tab by mouth daily. ramipril 10 mg capsule Commonly known as:  ALTACE  
TAKE ONE CAPSULE BY MOUTH ONCE DAILY  
  
 warfarin 5 mg tablet Commonly known as:  COUMADIN  
TAKE ONE TABLET BY MOUTH ONCE DAILY EXCEPT ON TUESDAY AND THURSDAY TAKE ONE-HALF TABLET To-Do List   
 Around 06/25/2018 Lab:  METABOLIC PANEL, BASIC Around 06/25/2018 Lab:  NT-PRO BNP Patient Instructions BMP and NT pro-BNP today Continue present medication regimen Follow-up with Dr. Lulu Hensley as scheduled and as needed Please provide this summary of care documentation to your next provider. Your primary care clinician is listed as Oral Meigs. If you have any questions after today's visit, please call [phone].

## 2018-06-25 NOTE — PROGRESS NOTES
Tracey Hartley presents today for follow-up after being instructed to take Lasix 40mg and potassium 10meq daily for complaints of increasing shortness of breath and fatigue. A NT pro-BNP checked on 6/7/18, was elevated at 4523. Since taking the lasix daily, he has noticed improvement in his shortness of breath and fatigue. When seen 2 weeks ago, a pharmacologic nuclear stress test and echocardiogram were also requested. The stress test was completed on 6/14/18 and it was considered abnormal and high risk likely due to severely depressed LV systolic function of 00% with severely dilated left ventricle. There was patchy radiotracer uptake without obvious ischemia or infarct. The echo was completed on 6/14/18 and it showed an EF of 15-20% with severe diffuse hypokinesis and grade 3 diastolic dysfunction. When compared to the echo done in Dec. 2016, there was no significant change. He is a 55-year-old  male with history of ischemic cardiomyopathy and chronic systolic heart failure. He also has history of atypical anginal chest discomfort and an abnormal nuclear myocardial perfusion study done in 2003 showed significant left ventricular dysfunction. His cardiac catheterization at that time demonstrated only mild diffuse coronary artery disease without significant obstructive disease and an ejection fraction that was markedly reduced at 25%. His last stress test was done in Jan. 2008. He is s/p AICD implant with upgrade to a Bi-V/ICD in Jan. 2006 with generator change in May 2015. He denies chest pain, tightness, heaviness, and palpitations. Denies shortness of breath at rest, admits to dyspnea on exertion (improved), denies orthopnea and PND. Denies abdominal bloating. Denies lightheadedness, dizziness, and syncope. Denies lower extremity edema and claudication. Denies nausea, vomiting, diarrhea, melena, hematochezia. Denies hematuria, urgency, frequency. Denies fever, chills. PMH:  Past Medical History:   Diagnosis Date    Abnormal nuclear cardiac imaging test 01/16/2008    Anterior patchy defect w/minimum ischemia. Inferior, inferior septal, inferoapical scar w/no ischemia. Scar in apex w/minimal ischemia & thinning. EF 27%. Severe global HYK. Gross LVE.  AICD (automatic cardioverter/defibrillator) present     Cardiomyopathy, nonischemic (HCC)     CHF (congestive heart failure) (HCC)     Dizziness     History of depression     History of echocardiogram 12/16/2014    Mod LVE. EF 20-25%. Severe diffuse hypk. Mild conc LVH. Gr 1 DDfx. RVSP 30 mmHg. Mild LAE. Mild MR. Unchanged from study of 9/20/12.  Long term (current) use of anticoagulants 3/1/2011    LV dysfunction     Urolithiasis        PSH:  Past Surgical History:   Procedure Laterality Date    [facility] AICD CONCERTO BI VENTRIC K642  1/11/06    HX COLONOSCOPY  2005    polyp    HX CYSTECTOMY  1980    on back    HX HEART CATHETERIZATION  2/10/03    HX OTHER SURGICAL      eye surgery at the age of 1    HX OTHER SURGICAL  9/25/2012    lead extraction of [employer] AICD    HX OTHER SURGICAL  5/20/15    AICD generator change out    HX PACEMAKER      HX TONSILLECTOMY      at 4years of age       MEDS:  Current Outpatient Prescriptions   Medication Sig    furosemide (LASIX) 40 mg tablet Take 1 Tab by mouth daily.  carvedilol (COREG) 25 mg tablet TAKE ONE TABLET BY MOUTH TWICE DAILY WITH MEALS    potassium chloride (KLOR-CON) 10 mEq tablet Take 1 Tab by mouth daily.  ramipril (ALTACE) 10 mg capsule TAKE ONE CAPSULE BY MOUTH ONCE DAILY    albuterol (PROVENTIL HFA, VENTOLIN HFA, PROAIR HFA) 90 mcg/actuation inhaler 2 puffs q 6 hrs prn cough/congestion    warfarin (COUMADIN) 5 mg tablet TAKE ONE TABLET BY MOUTH ONCE DAILY EXCEPT ON TUESDAY AND THURSDAY TAKE ONE-HALF TABLET     No current facility-administered medications for this visit.             Allergies and Sensitivities:  Allergies   Allergen Reactions    Codeine Nausea and Vomiting       Family History:  Family History   Problem Relation Age of Onset    Cancer Father        Social History:  He  reports that he has been smoking. He has a 3.00 pack-year smoking history. He has never used smokeless tobacco.  He  reports that he does not drink alcohol. Physical:  Visit Vitals    /64    Pulse 68    Ht 6' (1.829 m)    Wt 83.5 kg (184 lb)    SpO2 94%    BMI 24.95 kg/m2       He is down 3 pounds since his last visit     Exam:  Neck:  Supple, no JVD, no carotid bruits  CV:  Normal S1 and  S2, no murmurs, rubs, or gallops noted, AICD in left upper chest  Lungs:  Clear to ausculation throughout, no wheezes or rales  Abd:  Soft, non-tender, non-distended with good bowel sounds. No hepatosplenomegaly  Extremities:  No edema      Data:  EKG:  Not done today      LABS:  Lab Results   Component Value Date/Time    Sodium 139 06/07/2018 04:18 PM    Potassium 4.6 06/07/2018 04:18 PM    Chloride 100 06/07/2018 04:18 PM    CO2 32 06/07/2018 04:18 PM    Glucose 79 06/07/2018 04:18 PM    BUN 14 06/07/2018 04:18 PM    Creatinine 1.12 06/07/2018 04:18 PM     Lab Results   Component Value Date/Time    Cholesterol, total 157 12/07/2017 01:06 PM    HDL Cholesterol 42 12/07/2017 01:06 PM    LDL, calculated 100.4 (H) 12/07/2017 01:06 PM    Triglyceride 73 12/07/2017 01:06 PM    CHOL/HDL Ratio 3.7 12/07/2017 01:06 PM     Lab Results   Component Value Date/Time    ALT (SGPT) 32 12/07/2017 01:06 PM         Impression/Plan:  1. Fatigue  2. Dyspnea on exertion  3. Non-ischemic cardiomyopathy, last EF 15-20% (echo done in June 2018)  4. Acute on Chronic systolic heart failure, improving    Mr. Abdulkadir Meng was seen today for follow-up of complaints of increasing fatigue and dyspnea on exertion. When seen 2 weeks ago, a NT pro-BNP was checked and it was elevated at 4523. He was instructed to take Lasix 40mg daily along with potassium 10meq daily.   A pharmacologic nuclear stress test and echocardiogram were also requested. The stress test was completed on 6/14/18 and it was considered abnormal and high risk likely due to severely depressed LV systolic function of 02% with severely dilated left ventricle. There was patchy radiotracer uptake without obvious ischemia or infarct. The echo was completed on 6/14/18 and it showed an EF of 15-20% with severe diffuse hypokinesis and grade 3 diastolic dysfunction. When compared to the echo done in Dec. 2016, there was no significant change. Results were discussed with him and his wife. He states that he is feeling better and that the shortness of breath has improved. His weight is down 3 pounds and he does not exhibit any signs of volume overload. His blood pressure and heart rate are stable. I asked that he have another BMP and NT pro-BNP done today. For now, he was asked to continue his lasix and potassium at the current dosages. He will follow-up with Dr. Samia Bui as scheduled for July 2018 and as needed. Paul Negron MSN, FNP-BC    Please note:  Portions of this chart were created with Dragon medical speech to text program.  Unrecognized errors may be present.

## 2018-06-28 ENCOUNTER — TELEPHONE (OUTPATIENT)
Dept: CARDIOLOGY CLINIC | Age: 68
End: 2018-06-28

## 2018-06-28 DIAGNOSIS — I42.8 CARDIOMYOPATHY, NONISCHEMIC (HCC): Primary | ICD-10-CM

## 2018-06-28 NOTE — TELEPHONE ENCOUNTER
----- Message from Yulia [facility], NP sent at 6/26/2018  5:56 PM EDT -----  Please instruct Mr. Caroll Najjar to decrease his potassium to 10meq to 3 times a week as his potassium was 5.4. His NT pro-BNP was slightly above 3000 but improved from previous so he will need to stay on the Lasix 40mg daily. Will need to repeat a BMP in about 2 weeks as his creatinine bumped up to 1.4 with the use of daily Lasix. Thanks,  Rene Izquierdo  ----- Message -----     From: Lesvia Aguilar In [place]     Sent: 6/25/2018   3:43 PM       To:  Yulia [facility], NP

## 2018-07-03 ENCOUNTER — TELEPHONE ANTICOAG (OUTPATIENT)
Dept: CARDIOLOGY CLINIC | Age: 68
End: 2018-07-03

## 2018-07-03 DIAGNOSIS — Z79.01 LONG TERM CURRENT USE OF ANTICOAGULANT THERAPY: Primary | ICD-10-CM

## 2018-07-03 DIAGNOSIS — I42.8 CARDIOMYOPATHY, NONISCHEMIC (HCC): ICD-10-CM

## 2018-07-03 LAB — INR, EXTERNAL: 4

## 2018-07-11 ENCOUNTER — HOSPITAL ENCOUNTER (OUTPATIENT)
Dept: LAB | Age: 68
Discharge: HOME | End: 2018-07-11
Payer: MEDICARE

## 2018-07-11 DIAGNOSIS — I42.8 CARDIOMYOPATHY, NONISCHEMIC (HCC): ICD-10-CM

## 2018-07-11 LAB
ANION GAP SERPL CALC-SCNC: 3 MMOL/L (ref 3–18)
BUN SERPL-MCNC: 24 MG/DL (ref 7–18)
BUN/CREAT SERPL: 18 (ref 12–20)
CALCIUM SERPL-MCNC: 8.9 MG/DL (ref 8.5–10.1)
CHLORIDE SERPL-SCNC: 104 MMOL/L (ref 100–108)
CO2 SERPL-SCNC: 34 MMOL/L (ref 21–32)
CREAT SERPL-MCNC: 1.31 MG/DL (ref 0.6–1.3)
GLUCOSE SERPL-MCNC: 103 MG/DL (ref 74–99)
POTASSIUM SERPL-SCNC: 5.3 MMOL/L (ref 3.5–5.5)
SODIUM SERPL-SCNC: 141 MMOL/L (ref 136–145)

## 2018-07-11 PROCEDURE — 36415 COLL VENOUS BLD VENIPUNCTURE: CPT | Performed by: NURSE PRACTITIONER

## 2018-07-11 PROCEDURE — 80048 BASIC METABOLIC PNL TOTAL CA: CPT | Performed by: NURSE PRACTITIONER

## 2018-07-12 ENCOUNTER — TELEPHONE ANTICOAG (OUTPATIENT)
Dept: CARDIOLOGY CLINIC | Age: 68
End: 2018-07-12

## 2018-07-12 DIAGNOSIS — I42.8 CARDIOMYOPATHY, NONISCHEMIC (HCC): ICD-10-CM

## 2018-07-12 DIAGNOSIS — Z79.01 LONG TERM CURRENT USE OF ANTICOAGULANT THERAPY: Primary | ICD-10-CM

## 2018-07-12 LAB — INR, EXTERNAL: 1.8

## 2018-07-12 NOTE — PROGRESS NOTES
Verbal order and read back per Maricarmen Jenkins NP  Patient is not with therapeutic range  Continue Coumadin 5mg daily except 2.5mg on Tues and Thurs   No vitamin K rich foods today or tomorrow  Recheck 1 week   This has been fully explained to the patient, who indicates understanding.

## 2018-07-16 ENCOUNTER — CLINICAL SUPPORT (OUTPATIENT)
Dept: CARDIOLOGY CLINIC | Age: 68
End: 2018-07-16

## 2018-07-16 ENCOUNTER — OFFICE VISIT (OUTPATIENT)
Dept: CARDIOLOGY CLINIC | Age: 68
End: 2018-07-16

## 2018-07-16 VITALS
SYSTOLIC BLOOD PRESSURE: 92 MMHG | BODY MASS INDEX: 25.33 KG/M2 | OXYGEN SATURATION: 96 % | WEIGHT: 187 LBS | HEIGHT: 72 IN | DIASTOLIC BLOOD PRESSURE: 60 MMHG | HEART RATE: 81 BPM

## 2018-07-16 DIAGNOSIS — Z95.810 AICD (AUTOMATIC CARDIOVERTER/DEFIBRILLATOR) PRESENT: ICD-10-CM

## 2018-07-16 DIAGNOSIS — I50.22 CHRONIC SYSTOLIC CONGESTIVE HEART FAILURE (HCC): Primary | ICD-10-CM

## 2018-07-16 DIAGNOSIS — I42.8 CARDIOMYOPATHY, NONISCHEMIC (HCC): ICD-10-CM

## 2018-07-16 DIAGNOSIS — T82.110A AICD LEAD MALFUNCTION: ICD-10-CM

## 2018-07-16 DIAGNOSIS — I42.8 CARDIOMYOPATHY, NONISCHEMIC (HCC): Primary | ICD-10-CM

## 2018-07-16 RX ORDER — TADALAFIL 20 MG/1
20 TABLET ORAL AS NEEDED
Qty: 20 TAB | Refills: 3 | Status: SHIPPED | OUTPATIENT
Start: 2018-07-16 | End: 2018-09-03

## 2018-07-16 NOTE — PATIENT INSTRUCTIONS
Stop Altace, Lasix and KDur now  docplanner 24/26 one tablet twice daily Wednesday morning  Continue to weigh daily  If weight increases by 3 lbs overnight call the office

## 2018-07-16 NOTE — PROGRESS NOTES
HPI: I saw Holger Leach in my office today in cardiovascular evaluation regarding his dilated nonischemic cardiomyopathy. Mr. Bear Lara is a very pleasant 69year old white male with history of atypical anginal chest discomfort and an abnormal nuclear myocardial perfusion study back in 2003 showing significant left ventricular dysfunction. His cardiac catheterization at that time demonstrated only mild diffuse coronary artery disease without significant obstructive disease and an ejection fraction that was markedly reduced at 25%.      He was placed on Coreg, Altace and Lasix, as well as Coumadin to guard against peripheral embolization from the heart, and he has done very well over the years. He did have an AICD placed in the setting of his worsening left ventricular dysfunction with an upgrade to a biventricular ICD in January of 2006. He just had another generator change a little over a year ago on 5/20/15. He comes into the office today and relates that he is doing fair however he has been having some increased problems recently with failure symptomatology for which she has been on Lasix 40 mg daily. He had a BNP of 4523 when he was started on Lasix and 3136 less than 3 weeks later on June 25, 2018. He relates that he is doing reasonably well and his weight is down several pounds since when I saw him in January but is up 3 pounds since his visit here to my nurse practitioner on June 25, 2018. I should note that we repeated his echocardiogram on January 15, 2018 and again it demonstrated severe left ventricular dysfunction with ejection fraction of 15-20% range and grade III diastolic dysfunction. He comes in the office today and relates that he is doing reasonably well currently. He did come into the office to see my nurse practitioner on June 7, 2018 at which time he was having failure symptomatology for which she was placed on Lasix and he had a BNP at that time of 4523.   His BUN and creatinine at that time were normal at 14 and 1.12. He subsequently came back to the office on June 25, 2018 after being on Lasix 40 mg daily for almost 3 weeks and he is NT proBNP was down to 3136 but his BUN/creatinine were up to 22 and 1.42. He did have a repeat BMP on June 11, 2018 showing a BUN/creatinine of 24 and 1.31 with a potassium of 5.3. He tells me that he feels that he is fairly well compensated today and his weight is fairly stable although up slightly as indicated above from his last visit here. Encounter Diagnoses   Name Primary?  Chronic systolic congestive heart failure ([facility] Yes    AICD lead malfunction     Cardiomyopathy, nonischemic ([facility]        Discussion: This gentleman appears to be doing reasonably well at this juncture. He seems to be well compensated in terms of his heart failure and his AICD was checked today and he had only 3 nonsustained ventricular tachycardic episodes of 3 seconds at the longest and has 5.3 years remaining on his battery. His Optivol, I am pleased to note, was in the normal range suggesting that his heart failure is reasonably well compensated. I did discuss with this gentleman at time of his last visit possibility of using Entresto and he is willing to consider the change so I am going to discontinue Lasix, Altace, and potassium supplementation now. Since he is taking these medications this morning I will plan to start him on Entresto 24/26 mg twice daily starting on Wednesday morning or 48 hours from now. I have asked him to weigh himself daily and call us if his weight goes up by 3 pounds, but I suspect that this gentleman who is not needed significant diuretic therapy in the past may be able to get off Lasix altogether on Entresto. I am going to check a BMP and a BNP on him in 10-14 days. I will have him come back to see my nurse practitioner in a month and also plan see him again myself in 3 months. .    PCP:  Mena Kang MD       Past Medical History:   Diagnosis Date    Abnormal nuclear cardiac imaging test 01/16/2008    Anterior patchy defect w/minimum ischemia. Inferior, inferior septal, inferoapical scar w/no ischemia. Scar in apex w/minimal ischemia & thinning. EF 27%. Severe global HYK. Gross LVE.  AICD (automatic cardioverter/defibrillator) present     Cardiomyopathy, nonischemic (HCC)     CHF (congestive heart failure) (HCC)     Dizziness     History of depression     History of echocardiogram 12/16/2014    Mod LVE. EF 20-25%. Severe diffuse hypk. Mild conc LVH. Gr 1 DDfx. RVSP 30 mmHg. Mild LAE. Mild MR. Unchanged from study of 9/20/12.  Long term (current) use of anticoagulants 3/1/2011    LV dysfunction     Urolithiasis          Past Surgical History:   Procedure Laterality Date    [facility] AICD CONCERTO BI VENTRIC Z349  1/11/06    HX COLONOSCOPY  2005    polyp    HX CYSTECTOMY  1980    on back    HX HEART CATHETERIZATION  2/10/03    HX OTHER SURGICAL      eye surgery at the age of 1    HX OTHER SURGICAL  9/25/2012    lead extraction of [employer] AICD    HX OTHER SURGICAL  5/20/15    AICD generator change out    HX PACEMAKER      HX TONSILLECTOMY      at 4years of age         Current Outpatient Rx   Name  Route  Sig  Dispense  Refill    warfarin (COUMADIN) 5 mg tablet        take 1 tablet by mouth daily EXCEPT ON TUESDAY AND THURSDAY TAKE 2.5MG (HALF A TABLET)    90 Tab    3      ramipril (ALTACE) 10 mg capsule        take 1 capsule by mouth once daily    90 Cap    3      furosemide (LASIX) 40 mg tablet        take 1 tablet by mouth once daily AS NEEDED    20 Tab    3      tadalafil (CIALIS, ADCIRCA) 20 mg tablet    Oral    Take 1 Tab by mouth as needed.     6 Tab    3      carvedilol (COREG) 25 mg tablet        take 1 tablet by mouth twice a day with meals    180 Tab    3           Allergies   Allergen Reactions    Codeine Nausea and Vomiting       Social   Social History   Substance Use Topics    Smoking status: Current Every Day Smoker     Packs/day: 0.10     Years: 30.00    Smokeless tobacco: Never Used    Alcohol use No         Family history: family history includes Cancer in his father. Review of Systems:   Constitutional: Negative for chills, fever, malaise/fatigue and weight loss. Respiratory: Negative for cough, hemoptysis, shortness of breath and wheezing. Cardiovascular: Negative for chest pain, palpitations, orthopnea and leg swelling. Gastrointestinal: Negative. Musculoskeletal: Negative for falls, joint pain and myalgias. Neurological: Negative for dizziness. Physical Exam:   The patient is an alert, oriented, well developed, well nourished 69 y.o.  male who was in no acute distress at the time of my examination. Visit Vitals    BP 92/60    Pulse 81    Ht 6' (1.829 m)    Wt 84.8 kg (187 lb)    SpO2 96%    BMI 25.36 kg/m2      BP Readings from Last 3 Encounters:   07/16/18 92/60   06/25/18 118/64   06/07/18 114/80        Wt Readings from Last 3 Encounters:   07/16/18 84.8 kg (187 lb)   06/25/18 83.5 kg (184 lb)   06/07/18 84.8 kg (187 lb)       HEENT: Conjunctivae white, mucosa moist, no pallor or cyanosis. Marked deviation of left eye laterally. Neck: Supple without masses, tenderness or thyromegaly. No jugular venous distention. Carotid upstrokes are full bilaterally, without bruits. Cardiovascular: Chest is symmetrical with good excursion. Defibrillator in left upper chest with well healed scar and one small stitch which I removed. [place] is near [employer] No lifts, heaves or thrills. S1 and S2 are normal, without appreciable murmurs, rubs, clicks or gallops. Defibrillator site is well healed. Lungs: Clear to auscultation in all fields, except for a few rales in the left base. Abdomen: Soft, with no masses, tenderness or organomegaly. Extremities: No edema, with full peripheral pulses.      Review of Data: Please refer to past medical history for most recent cardiac testing. Results for orders placed or performed in visit on 06/07/18   AMB POC EKG ROUTINE W/ 12 LEADS, INTER & REP     Status: None    Narrative    Read by Laureen Olivas, DO - Electronic ventricular pacemaker. Pacemaker ECG. No further analysis. Laureen Olivas D.O., FSAMUELC. [facility]  [facility] [place]  [address] [place] [address]. [address]    PLEASE NOTE:  This document has been produced using voice recognition software. Unrecognized errors in transcription may be present.

## 2018-07-16 NOTE — PROGRESS NOTES
I have personally seen and evaluated the device findings. Interrogation reviewed and I agree with assessment.     Savanna Davis

## 2018-07-16 NOTE — PROGRESS NOTES
1. Have you been to the ER, urgent care clinic since your last visit? Hospitalized since your last visit?no    2. Have you seen or consulted any other health care providers outside of the [address] [place] [address] since your last visit? Include any pap smears or colon screening.  no

## 2018-07-16 NOTE — MR AVS SNAPSHOT
[address] [place] [id number] 
[phone] Patient: Diana Chacon MRN: [id number] :1950 Visit Information Date & Time Provider Department Dept. Phone Encounter #  
 2018 11:00 AM Adelia Gutierrez DO [facility] [address] [id number] Upcoming Health Maintenance Date Due Hepatitis C Screening 1950 FOBT Q 1 YEAR AGE 50-75 10/21/1998 DTaP/Tdap/Td series (1 - Tdap) 2003 ZOSTER VACCINE AGE 60> 2008 GLAUCOMA SCREENING Q2Y 10/21/2013 Influenza Age 5 to Adult 2018 MEDICARE YEARLY EXAM 2018 Allergies as of 2018  Review Complete On: 2018 By: Adelia Gutierrez DO Severity Noted Reaction Type Reactions Codeine Low 10/30/2015   Intolerance Nausea and Vomiting Current Immunizations  Never Reviewed Name Date Influenza High Dose Vaccine PF 2017, 10/15/2016 Pneumococcal Conjugate (PCV-13) 10/15/2016 Pneumococcal Polysaccharide (PPSV-23) 2017, 2010 Td 2003 Not reviewed this visit You Were Diagnosed With   
  
 Codes Comments Chronic systolic congestive heart failure (HCC)    -  Primary ICD-10-CM: M10.09 ICD-9-CM: 428.22, 428.0 AICD lead malfunction     ICD-10-CM: T82.110A 
ICD-9-CM: 996.04 [facility]     ICD-10-CM: I42.8 ICD-9-CM: 425.4 Vitals BP Pulse Height(growth percentile) Weight(growth percentile) SpO2 BMI  
 92/60 81 6' (1.829 m) 187 lb (84.8 kg) 96% 25.36 kg/m2 Smoking Status Current Every Day Smoker Vitals History BMI and BSA Data Body Mass Index Body Surface Area  
 25.36 kg/m 2 2.08 m 2 Preferred Pharmacy Pharmacy Name Phone 339 [address] [phone] Your Updated Medication List  
  
   
 This list is accurate as of 7/16/18 12:00 PM.  Always use your most recent med list.  
  
  
  
  
 albuterol 90 mcg/actuation inhaler Commonly known as:  PROVENTIL HFA, VENTOLIN HFA, PROAIR HFA  
2 puffs q 6 hrs prn cough/congestion  
  
 carvedilol 25 mg tablet Commonly known as:  COREG  
TAKE ONE TABLET BY MOUTH TWICE DAILY WITH MEALS  
  
 sacubitril-valsartan 24-26 mg tablet Commonly known as:  ENTRESTO Take 1 Tab by mouth two (2) times a day. tadalafil 20 mg tablet Commonly known as:  CIALIS Take 1 Tab by mouth as needed. warfarin 5 mg tablet Commonly known as:  COUMADIN  
TAKE ONE TABLET BY MOUTH ONCE DAILY EXCEPT ON TUESDAY AND THURSDAY TAKE ONE-HALF TABLET Prescriptions Printed Refills  
 tadalafil (CIALIS) 20 mg tablet 3 Sig: Take 1 Tab by mouth as needed. Class: Print Route: Oral  
  
Prescriptions Sent to Pharmacy Refills  
 sacubitril-valsartan (ENTRESTO) 24 mg/26 mg tablet 6 Sig: Take 1 Tab by mouth two (2) times a day. Class: Normal  
 Pharmacy: [facility] DR JANEY HASSAN [address] #: [phone] Route: Oral  
  
To-Do List   
 07/16/2018 Lab:  NT-PRO BNP   
  
 07/25/2018 Lab:  METABOLIC PANEL, BASIC Patient Instructions Stop Altace, Lasix and KDur now Start Entresto 24/26 one tablet twice daily Wednesday morning Continue to weigh daily If weight increases by 3 lbs overnight call the office Please provide this summary of care documentation to your next provider. Your primary care clinician is listed as Desiree Bardales. If you have any questions after today's visit, please call [phone].

## 2018-07-18 ENCOUNTER — TELEPHONE ANTICOAG (OUTPATIENT)
Dept: CARDIOLOGY CLINIC | Age: 68
End: 2018-07-18

## 2018-07-18 DIAGNOSIS — I42.8 CARDIOMYOPATHY, NONISCHEMIC (HCC): ICD-10-CM

## 2018-07-18 DIAGNOSIS — Z79.01 LONG TERM CURRENT USE OF ANTICOAGULANT THERAPY: Primary | ICD-10-CM

## 2018-07-18 LAB — INR, EXTERNAL: 2.6

## 2018-07-19 NOTE — PROGRESS NOTES
Verbal order and read back per Adrienne Anaya NP  Patient is within therapeutic range   Continue Coumadin 5mg daily except 2.5mg on Tues and Thurs   Recheck 1 week  This has been fully explained to the patient, who indicates understanding.

## 2018-07-20 ENCOUNTER — DOCUMENTATION ONLY (OUTPATIENT)
Dept: CARDIOLOGY CLINIC | Age: 68
End: 2018-07-20

## 2018-07-20 NOTE — PROGRESS NOTES
Binu Espinosa ([place]: [facility]) - [phone]  Entresto 24-26MG OR TABS  Status: PA Response - Approved  Created: July 17th, 2018 [id number]  Sent: July 17th, 2018

## 2018-07-25 ENCOUNTER — HOSPITAL ENCOUNTER (OUTPATIENT)
Dept: LAB | Age: 68
Discharge: HOME | End: 2018-07-25
Payer: MEDICARE

## 2018-07-25 ENCOUNTER — TELEPHONE ANTICOAG (OUTPATIENT)
Dept: CARDIOLOGY CLINIC | Age: 68
End: 2018-07-25

## 2018-07-25 DIAGNOSIS — I50.22 CHRONIC SYSTOLIC CONGESTIVE HEART FAILURE (HCC): ICD-10-CM

## 2018-07-25 DIAGNOSIS — I42.8 CARDIOMYOPATHY, NONISCHEMIC (HCC): ICD-10-CM

## 2018-07-25 DIAGNOSIS — Z79.01 LONG TERM CURRENT USE OF ANTICOAGULANT THERAPY: Primary | ICD-10-CM

## 2018-07-25 LAB
ANION GAP SERPL CALC-SCNC: 6 MMOL/L (ref 3–18)
BNP SERPL-MCNC: 2109 PG/ML (ref 0–900)
BUN SERPL-MCNC: 12 MG/DL (ref 7–18)
BUN/CREAT SERPL: 10 (ref 12–20)
CALCIUM SERPL-MCNC: 9.1 MG/DL (ref 8.5–10.1)
CHLORIDE SERPL-SCNC: 105 MMOL/L (ref 100–108)
CO2 SERPL-SCNC: 30 MMOL/L (ref 21–32)
CREAT SERPL-MCNC: 1.18 MG/DL (ref 0.6–1.3)
GLUCOSE SERPL-MCNC: 109 MG/DL (ref 74–99)
INR, EXTERNAL: 3.1
POTASSIUM SERPL-SCNC: 4.6 MMOL/L (ref 3.5–5.5)
SODIUM SERPL-SCNC: 141 MMOL/L (ref 136–145)

## 2018-07-25 PROCEDURE — 83880 ASSAY OF NATRIURETIC PEPTIDE: CPT | Performed by: INTERNAL MEDICINE

## 2018-07-25 PROCEDURE — 36415 COLL VENOUS BLD VENIPUNCTURE: CPT | Performed by: INTERNAL MEDICINE

## 2018-07-25 PROCEDURE — 80048 BASIC METABOLIC PNL TOTAL CA: CPT | Performed by: INTERNAL MEDICINE

## 2018-07-25 NOTE — PROGRESS NOTES
Per your last note \" I did discuss with this gentleman at time of his last visit possibility of using Entresto and he is willing to consider the change so I am going to discontinue Lasix, Altace, and potassium supplementation now. Since he is taking these medications this morning I will plan to start him on Entresto 24/26 mg twice daily starting on Wednesday morning or 48 hours from now. I have asked him to weigh himself daily and call us if his weight goes up by 3 pounds, but I suspect that this gentleman who is not needed significant diuretic therapy in the past may be able to get off Lasix altogether on Entresto. I am going to check a BMP and a BNP on him in 10-14 days.

## 2018-07-25 NOTE — PROGRESS NOTES
Verbal order and read back per Lexi Pete NP  Patient is not with therapeutic range  Take 2.5 mg today then Continue Coumadin 5mg daily except 2.5mg on Tues and Thurs   Recheck 1 week  This has been fully explained to the patient, who indicates understanding.

## 2018-07-28 NOTE — PROGRESS NOTES
I just decided to get a BNP with his baseline BMP for starting Entresto. It does show some mild failure, but it is better than it was a month ago and his kidney function is fine actually somewhat better than it was a month ago as well. Please let the patient know.  ES

## 2018-07-30 RX ORDER — WARFARIN SODIUM 5 MG/1
TABLET ORAL
Qty: 90 TAB | Refills: 3 | Status: ON HOLD | OUTPATIENT
Start: 2018-07-30 | End: 2018-09-02

## 2018-08-01 ENCOUNTER — TELEPHONE ANTICOAG (OUTPATIENT)
Dept: CARDIOLOGY CLINIC | Age: 68
End: 2018-08-01

## 2018-08-01 DIAGNOSIS — Z79.01 LONG TERM CURRENT USE OF ANTICOAGULANT THERAPY: Primary | ICD-10-CM

## 2018-08-01 DIAGNOSIS — I42.8 CARDIOMYOPATHY, NONISCHEMIC (HCC): ICD-10-CM

## 2018-08-01 LAB — INR, EXTERNAL: 3.2

## 2018-08-02 NOTE — PROGRESS NOTES
Verbal order and read back per Justin Bell NP  Patient is not with therapeutic range  Spoke to patient on 8/2 patient took 2.5 mg wed and asked patient to hold today, then Continue Coumadin 5mg daily except 2.5mg on Tues and Thurs   Recheck 1 week  This has been fully explained to the patient, who indicates understanding.

## 2018-08-08 ENCOUNTER — TELEPHONE ANTICOAG (OUTPATIENT)
Dept: CARDIOLOGY CLINIC | Age: 68
End: 2018-08-08

## 2018-08-08 DIAGNOSIS — I42.8 CARDIOMYOPATHY, NONISCHEMIC (HCC): ICD-10-CM

## 2018-08-08 DIAGNOSIS — Z79.01 LONG TERM CURRENT USE OF ANTICOAGULANT THERAPY: Primary | ICD-10-CM

## 2018-08-08 LAB — INR, EXTERNAL: 2.6

## 2018-08-16 ENCOUNTER — OFFICE VISIT (OUTPATIENT)
Dept: CARDIOLOGY CLINIC | Age: 68
End: 2018-08-16

## 2018-08-16 VITALS
WEIGHT: 191 LBS | SYSTOLIC BLOOD PRESSURE: 120 MMHG | HEART RATE: 66 BPM | OXYGEN SATURATION: 97 % | HEIGHT: 72 IN | BODY MASS INDEX: 25.87 KG/M2 | DIASTOLIC BLOOD PRESSURE: 68 MMHG

## 2018-08-16 DIAGNOSIS — I42.8 CARDIOMYOPATHY, NONISCHEMIC (HCC): Primary | ICD-10-CM

## 2018-08-16 DIAGNOSIS — Z79.01 LONG TERM CURRENT USE OF ANTICOAGULANT THERAPY: ICD-10-CM

## 2018-08-16 LAB
INR BLD: 3.9
PT POC: 47.3 SECONDS
VALID INTERNAL CONTROL?: YES

## 2018-08-16 NOTE — PROGRESS NOTES
Mateo Norris presents today for a one month follow-up after being started on Entresto 24/26 by Dr. Krunal Nathan when seen by him on 7/16/18. His Lasix and potassium were discontinued at that time. His heart failure appeared to be well compensated during that visit and a device interrogation showed that his Optivol level was below threshold and within normal range. He is a 49-year-old  male with history of ischemic cardiomyopathy and chronic systolic heart failure. He also has history of atypical anginal chest discomfort and an abnormal nuclear myocardial perfusion study done in 2003 showed significant left ventricular dysfunction. His cardiac catheterization at that time demonstrated only mild diffuse coronary artery disease without significant obstructive disease and an ejection fraction that was markedly reduced at 25%. He is s/p AICD implant with upgrade to a Bi-V/ICD in Jan. 2006 with generator change in May 2015. He had a pharmacologic nuclear stress test done on  6/14/18 and it was considered abnormal and high risk likely due to severely depressed LV systolic function of 19% with severely dilated left ventricle. There was patchy radiotracer uptake without obvious ischemia or infarct. The echo was completed on 6/14/18 and it showed an EF of 15-20% with severe diffuse hypokinesis and grade 3 diastolic dysfunction. When compared to the echo done in Dec. 2016, there was no significant change. He states that he has been feeling well and has tolerated the addition of Entresto. He has not noticed any progressive weight gain or other heart failure symptoms. He denies chest pain, tightness, heaviness, and palpitations. Denies shortness of breath at rest, dyspnea on exertion), denies orthopnea and PND. Denies abdominal bloating. Denies lightheadedness, dizziness, and syncope. Denies lower extremity edema and claudication. Denies nausea, vomiting, diarrhea, melena, hematochezia.   Denies hematuria, urgency, frequency. Denies fever, chills. PMH:  Past Medical History:   Diagnosis Date    Abnormal nuclear cardiac imaging test 01/16/2008    Anterior patchy defect w/minimum ischemia. Inferior, inferior septal, inferoapical scar w/no ischemia. Scar in apex w/minimal ischemia & thinning. EF 27%. Severe global HYK. Gross LVE.  AICD (automatic cardioverter/defibrillator) present     Cardiomyopathy, nonischemic (HCC)     CHF (congestive heart failure) (HCC)     Dizziness     History of depression     History of echocardiogram 12/16/2014    Mod LVE. EF 20-25%. Severe diffuse hypk. Mild conc LVH. Gr 1 DDfx. RVSP 30 mmHg. Mild LAE. Mild MR. Unchanged from study of 9/20/12.  Long term (current) use of anticoagulants 3/1/2011    LV dysfunction     Urolithiasis        PSH:  Past Surgical History:   Procedure Laterality Date    [facility]. AICD CONCERTO BI VENTRIC D496  1/11/06    HX COLONOSCOPY  2005    polyp    HX CYSTECTOMY  1980    on back    HX HEART CATHETERIZATION  2/10/03    HX OTHER SURGICAL      eye surgery at the age of 1    HX OTHER SURGICAL  9/25/2012    lead extraction of [employer] AICD    HX OTHER SURGICAL  5/20/15    AICD generator change out    HX PACEMAKER      HX TONSILLECTOMY      at 4years of age       MEDS:  Current Outpatient Prescriptions   Medication Sig    warfarin (COUMADIN) 5 mg tablet TAKE ONE TABLET BY MOUTH ONCE DAILY EXCEPT ON TUESDAY AND THURSDAY TAKE ONE-HALF TABLET    sacubitril-valsartan (ENTRESTO) 24 mg/26 mg tablet Take 1 Tab by mouth two (2) times a day.  tadalafil (CIALIS) 20 mg tablet Take 1 Tab by mouth as needed.  carvedilol (COREG) 25 mg tablet TAKE ONE TABLET BY MOUTH TWICE DAILY WITH MEALS    albuterol (PROVENTIL HFA, VENTOLIN HFA, PROAIR HFA) 90 mcg/actuation inhaler 2 puffs q 6 hrs prn cough/congestion     No current facility-administered medications for this visit.             Allergies and Sensitivities:  Allergies Allergen Reactions    Codeine Nausea and Vomiting       Family History:  Family History   Problem Relation Age of Onset    Cancer Father        Social History:  He  reports that he has been smoking. He has a 3.00 pack-year smoking history. He has never used smokeless tobacco.  He  reports that he does not drink alcohol. Physical:  Visit Vitals    /68    Pulse 66    Ht 6' (1.829 m)    Wt 86.6 kg (191 lb)    SpO2 97%    BMI 25.9 kg/m2       He is up 4 pounds since his last visit       Exam:  Neck:  Supple, no JVD, no carotid bruits  CV:  Normal S1 and  S2, no murmurs, rubs, or gallops noted, AICD in left upper chest  Lungs:  Clear to ausculation throughout, no wheezes or rales  Abd:  Soft, non-tender, non-distended with good bowel sounds. No hepatosplenomegaly  Extremities:  No edema      Data:  EKG:  Not done today      LABS:  Lab Results   Component Value Date/Time    Sodium 141 07/25/2018 09:03 AM    Potassium 4.6 07/25/2018 09:03 AM    Chloride 105 07/25/2018 09:03 AM    CO2 30 07/25/2018 09:03 AM    Glucose 109 (H) 07/25/2018 09:03 AM    BUN 12 07/25/2018 09:03 AM    Creatinine 1.18 07/25/2018 09:03 AM     Lab Results   Component Value Date/Time    Cholesterol, total 157 12/07/2017 01:06 PM    HDL Cholesterol 42 12/07/2017 01:06 PM    LDL, calculated 100.4 (H) 12/07/2017 01:06 PM    Triglyceride 73 12/07/2017 01:06 PM    CHOL/HDL Ratio 3.7 12/07/2017 01:06 PM     Lab Results   Component Value Date/Time    ALT (SGPT) 32 12/07/2017 01:06 PM         Impression/Plan:  1. Non-ischemic cardiomyopathy, last EF 15-20% (echo done in June 2018)  2. Chronic systolic heart failure,      Mr. Diane Myers was seen today for a one month follow-up after being started on Entresto 24/26 when seen by Dr. Jan Madera on 7/16/18. He has tolerated the addition of Entresto and states that he feels well. He has not noticed any progressive weight gain at home. His weight has fluctuated a few pounds up and down.   His weight is up 4 pounds according to our scale. He denies lower extremity edema. His blood pressure and heart rate are stable. Breath sounds are clear and he has no edema. The plan for today was to titrate his [facility] but he just had the 24/26 dose refilled yesterday and he paid around $200 for the month supply. He was asked to continue the present dose and he has been scheduled to return in a little less than one month for possible titration. He was given the Entresto initial 30 day free card as well as the co-pay card and he was asked to activate it when we give him the new prescription for the next dose. If they have any questions regarding the co-pay card, I asked that they speak with a  with  [facility] so that their questions can be addressed. His INR today was 3.9 and he was instructed to hold today's dose and his dose was decreased to 5mg daily except 2.5mg on Tu-Th-Sat. He will recheck his INR in 2 weeks as he has a home monitor and will call us with results. He will follow-up with Dr. Ildefonso Torre as scheduled for October 2018 and as needed. Betty Remy MSN, FNP-BC    Please note:  Portions of this chart were created with Dragon medical speech to text program.  Unrecognized errors may be present.

## 2018-08-16 NOTE — PATIENT INSTRUCTIONS
Continue present dose of Entresto 24/26 twice a day since you just got your prescription refilled.     Return in about 1 month for Entresto titration with Olivia Johnson  All other medications to remain the same  Follow-up with Dr. Kylah Jones as scheduled and as needed  Coumadin as directed- Hold today and then decrease dose to 5mg daily except 2.5mg on Tu-Th-Sat

## 2018-08-16 NOTE — PROGRESS NOTES
1. Have you been to the ER, urgent care clinic since your last visit? Hospitalized since your last visit?no    2. Have you seen or consulted any other health care providers outside of the [address] [place] [address] since your last visit? Include any pap smears or colon screening.  no

## 2018-08-16 NOTE — MR AVS SNAPSHOT
[address] [place] [address] [address] [place] [address] 
[phone] Patient: Chayo Tillman MRN: [id number] :1950 Visit Information Date & Time Provider Department Dept. Phone Encounter #  
 2018 10:30 AM Delroy Fox NP [facility] [address] [id number] Your Appointments 2018 11:00 AM  
Follow Up with Delroy Fox NP [facility] ([address] [place] [address]) Appt Note: 4 week f/u for Entresto titration [place] [id number]  
[phone] Rodriguez Arlene  
  
    
 10/17/2018 11:00 AM  
Follow Up with Sushila Nolen DO [facility] ([address] [place] [address]) Appt Note: 3 month follow up with Dr. Melvin Dela Cruz in Oct. pt will also need device check same day. Please correlate device check with Raquel. appt. [facility] [address] [id number]  
[phone] [facility] [id number]  
  
    
 10/18/2018 10:15 AM  
PROCEDURE with Pacer Hv Csi [facility] ([address] [place] [address]) Appt Note: 3 month in office device check per [facility] please correlate/move device check to be done with Dr. Arely Bangura 3 month follow up once it is scheduled. [address] [facility] [id number]  
[phone] [address] [place] [address] [address] Upcoming Health Maintenance Date Due Hepatitis C Screening 1950 FOBT Q 1 YEAR AGE 50-75 10/21/1998 DTaP/Tdap/Td series (1 - Tdap) 2003 ZOSTER VACCINE AGE 60> 2008 GLAUCOMA SCREENING Q2Y 10/21/2013 Influenza Age 5 to Adult 2018 MEDICARE YEARLY EXAM 2018 Allergies as of 8/16/2018  Review Complete On: 8/16/2018 By: Jose Rodriguez NP Severity Noted Reaction Type Reactions Codeine Low 10/30/2015   Intolerance Nausea and Vomiting Current Immunizations  Never Reviewed Name Date Influenza High Dose Vaccine PF 9/25/2017, 10/15/2016 Pneumococcal Conjugate (PCV-13) 10/15/2016 Pneumococcal Polysaccharide (PPSV-23) 11/9/2017, 12/1/2010 Td 1/27/2003 Not reviewed this visit You Were Diagnosed With   
  
 Codes Comments Cardiomyopathy, nonischemic ([facility] [facility]    -  Primary ICD-10-CM: I42.8 ICD-9-CM: 425.4 Long term current use of anticoagulant therapy     ICD-10-CM: Z79.01 
ICD-9-CM: V58.61 Vitals BP Pulse Height(growth percentile) Weight(growth percentile) SpO2 BMI  
 120/68 66 6' (1.829 m) 191 lb (86.6 kg) 97% 25.9 kg/m2 Smoking Status Current Every Day Smoker Vitals History BMI and BSA Data Body Mass Index Body Surface Area  
 25.9 kg/m 2 2.1 m 2 Preferred Pharmacy Pharmacy Name Phone [address] [phone] Your Updated Medication List  
  
   
This list is accurate as of 8/16/18 11:35 AM.  Always use your most recent med list.  
  
  
  
  
 albuterol 90 mcg/actuation inhaler Commonly known as:  PROVENTIL HFA, VENTOLIN HFA, PROAIR HFA  
2 puffs q 6 hrs prn cough/congestion  
  
 carvedilol 25 mg tablet Commonly known as:  COREG  
TAKE ONE TABLET BY MOUTH TWICE DAILY WITH MEALS  
  
 sacubitril-valsartan 24-26 mg tablet Commonly known as:  ENTRESTO Take 1 Tab by mouth two (2) times a day. tadalafil 20 mg tablet Commonly known as:  CIALIS Take 1 Tab by mouth as needed. warfarin 5 mg tablet Commonly known as:  COUMADIN  
TAKE ONE TABLET BY MOUTH ONCE DAILY EXCEPT ON TUESDAY AND THURSDAY TAKE ONE-HALF TABLET Description Hold x 1 then decrease Coumadin to 5mg daily except 2.5mg on Tu-Th-Sat August 2018 Details Sun Mon Tue Wed Thu Fri Sat  
     1  
  
  
  
   2  
  
  
  
   3  
  
  
  
   4  
  
  
  
  
  5  
  
  
  
   6  
  
  
  
   7  
  
  
  
   8  
  
  
  
   9  
  
  
  
   10  
  
  
  
   11  
  
  
  
  
  12  
  
  
  
   13  
  
  
  
   14  
  
  
  
   15  
  
  
  
   16  
  
Hold See details 17  
  
5 mg  
  
   18  
  
2.5 mg  
  
  
  19  
  
5 mg  
  
   20  
  
5 mg  
  
   21  
  
2.5 mg  
  
   22  
  
5 mg  
  
   23  
  
2.5 mg  
  
   24  
  
5 mg  
  
   25  
  
2.5 mg  
  
  
  26  
  
5 mg  
  
   27  
  
5 mg  
  
   28  
  
2.5 mg  
  
   29  
  
5 mg  
  
   30  
  
2.5 mg  
  
   31 Date Details 08/16 This INR check INR: 3.9! Date of next INR:  8/30/2018 How to take your warfarin dose To take:  2.5 mg Take half of a 5 mg tablet. To take:  5 mg Take one of the 5 mg tablets. Hold Do not take your warfarin dose. See the Details table to the right for additional instructions. We Performed the Following AMB POC PT/INR [57454 CPT(R)] Patient Instructions Continue present dose of Entresto 24/26 twice a day since you just got your prescription refilled. Return in about 1 month for Entresto titration with Elly Kam All other medications to remain the same Follow-up with Dr. Joseluis Dubose as scheduled and as needed Coumadin as directed- Hold today and then decrease dose to 5mg daily except 2.5mg on Tu-Th-Sat Please provide this summary of care documentation to your next provider. Your primary care clinician is listed as Beverly Ribera. If you have any questions after today's visit, please call [phone].

## 2018-08-28 PROBLEM — I24.8 DEMAND ISCHEMIA OF MYOCARDIUM (HCC): Status: ACTIVE | Noted: 2018-08-28

## 2018-08-28 PROBLEM — J98.01 BRONCHOSPASM: Status: ACTIVE | Noted: 2018-08-28

## 2018-09-20 ENCOUNTER — TELEPHONE ANTICOAG (OUTPATIENT)
Dept: CARDIOLOGY CLINIC | Age: 68
End: 2018-09-20

## 2018-09-20 DIAGNOSIS — I42.8 CARDIOMYOPATHY, NONISCHEMIC (HCC): ICD-10-CM

## 2018-09-20 DIAGNOSIS — Z79.01 LONG TERM CURRENT USE OF ANTICOAGULANT THERAPY: Primary | ICD-10-CM

## 2018-09-20 LAB — INR, EXTERNAL: 2.9

## 2018-09-20 NOTE — PROGRESS NOTES
We are no longer following patient's INR-Dr ORANTES DE SILVER [facility] will be following patient going forward as the office is closer to patient's home.

## 2018-10-05 ENCOUNTER — TELEPHONE ANTICOAG (OUTPATIENT)
Dept: CARDIOLOGY CLINIC | Age: 68
End: 2018-10-05

## 2019-08-23 RX ORDER — WARFARIN SODIUM 5 MG/1
TABLET ORAL
Qty: 180 TAB | Refills: 3 | Status: SHIPPED | OUTPATIENT
Start: 2019-08-23

## 2021-02-12 NOTE — PROGRESS NOTES
Verbal order and read back per Maricarmen Jenkins NP  Patient is not with therapeutic range  Hold x1 and 2.5 mg on 7/4 then Continue Coumadin 5mg daily except 2.5mg on Tues and Thurs   Recheck 2 weeks  This has been fully explained to the patient, who indicates understanding. Home

## 2021-08-23 PROBLEM — I50.9 ACUTE EXACERBATION OF CHF (CONGESTIVE HEART FAILURE) (HCC): Status: ACTIVE | Noted: 2021-08-23
